# Patient Record
Sex: FEMALE | Race: WHITE | NOT HISPANIC OR LATINO | Employment: OTHER | URBAN - METROPOLITAN AREA
[De-identification: names, ages, dates, MRNs, and addresses within clinical notes are randomized per-mention and may not be internally consistent; named-entity substitution may affect disease eponyms.]

---

## 2017-04-24 ENCOUNTER — OFFICE VISIT (OUTPATIENT)
Dept: URGENT CARE | Facility: RETAIL CLINIC | Age: 72
End: 2017-04-24
Payer: COMMERCIAL

## 2017-04-24 VITALS
HEART RATE: 74 BPM | TEMPERATURE: 98.2 F | SYSTOLIC BLOOD PRESSURE: 124 MMHG | DIASTOLIC BLOOD PRESSURE: 77 MMHG | OXYGEN SATURATION: 97 %

## 2017-04-24 DIAGNOSIS — R05.9 COUGH: ICD-10-CM

## 2017-04-24 DIAGNOSIS — J20.9 ACUTE BRONCHITIS WITH COEXISTING CONDITION REQUIRING PROPHYLACTIC TREATMENT: Primary | ICD-10-CM

## 2017-04-24 PROCEDURE — 99213 OFFICE O/P EST LOW 20 MIN: CPT | Performed by: PHYSICIAN ASSISTANT

## 2017-04-24 RX ORDER — BENZONATATE 200 MG/1
200 CAPSULE ORAL 3 TIMES DAILY PRN
Qty: 21 CAPSULE | Refills: 0 | Status: SHIPPED | OUTPATIENT
Start: 2017-04-24 | End: 2017-12-01

## 2017-04-24 RX ORDER — AZITHROMYCIN 250 MG/1
TABLET, FILM COATED ORAL
Qty: 6 TABLET | Refills: 0 | Status: SHIPPED | OUTPATIENT
Start: 2017-04-24 | End: 2017-12-01

## 2017-04-24 NOTE — NURSING NOTE
"Chief Complaint   Patient presents with     Cough     x 6 days     Headache     Chills       Initial /77  Pulse 74  Temp 98.2  F (36.8  C) (Oral)  SpO2 97% Estimated body mass index is 23.11 kg/(m^2) as calculated from the following:    Height as of 2/6/13: 5' 5.5\" (1.664 m).    Weight as of 11/23/16: 141 lb (64 kg).  Medication Reconciliation: complete   Yolanda Hidalgo      "

## 2017-04-24 NOTE — PATIENT INSTRUCTIONS
Please FOLLOW UP at primary care clinic if not improving, new symptoms, worse or this does not resolve.  [facility]  [phone]

## 2017-04-24 NOTE — PROGRESS NOTES
Chief Complaint   Patient presents with     Cough     x 6 days     Headache     Chills         SUBJECTIVE:   Pt. presenting to [facility] -  with a chief complaint of sudden onset of chills and cough 6 days ago. Cough persists. No fever. Looser stools, fluids ok.Tired.. No SOB or chest pain at rest.  Hx of asthma  Or COPD - none.  Onset of symptoms sudden  Course of illness is same.    Severity moderate  Current and Associated symptoms: fever, cough , headache, malaise  Treatment measures tried include Fluids, OTC meds and Rest.  Predisposing factors include day care kids 'colds'.  Last antibiotic 11/2016 Bactrim for UTI      Smoker no    No past medical history on file.  Past Surgical History:   Procedure Laterality Date     C APPENDECTOMY       C LIGATE FALLOPIAN TUBE,POSTPARTUM      Tubal Ligation     C VAGINAL HYSTERECTOMY  1981    Hysterectomy, Vaginal     Patient Active Problem List   Diagnosis     Viral warts     CARDIOVASCULAR SCREENING; LDL GOAL LESS THAN 160     No current outpatient prescriptions on file.     No current facility-administered medications for this visit.      OBJECTIVE:  /77  Pulse 74  Temp 98.2  F (36.8  C) (Oral)  SpO2 97%    GENERAL APPEARANCE: cooperative, alert and no distress. Appears well hydrated.  EYES: conjunctiva clear  HENT: Rt ear canal  clear and TM normal   Lt ear canal clear and TM normal   Nose minimal congestion. no discharge  Mouth without ulcers or lesions. no erythema. no exudate.   NECK: supple, few small shoddy NT ant nodes. No  posterior nodes.  RESP: lungs  no rales or wheezes but some sonorous ronchi daphne. Breathing easily. Deep loose cough  CV: regular rates and rhythm  ABDOMEN:  soft, nontender, no HSM or masses and bowel sounds normal   SKIN: no suspicious lesions or rashes  no tenderness to palpate over  sinus areas.    ASSESSMENT:     Cough  Acute bronchitis with coexisting condition requiring prophylactic  treatment      PLAN:  Symptomatic measures   Prescriptions as below. Discussed indications, dosing, side affects and adverse reactions of medications with  Patient -Z pack (no history of arrhythmias) and Tessalon Perles  Eat yogurt daily or take a probiotic supplement when on antibiotics.  OTC cough suppressant/expectorant discussed -see Rx    Cool mist vaporizer.   Stay in clean air environment.  > rest.  > fluids.  Contagiousness and hygiene discussed.  Fever and pain  control measures discussed.   If unable to swallow or any breathing difficulty to go to ED AVS given and discussed:  Patient Instructions     Please FOLLOW UP at primary care clinic if not improving, new symptoms, worse or this does not resolve.  [facility]  [phone]    Pt is comfortable with this plan.  Electronically signed,  GOYO Ayoub, PAC

## 2017-04-24 NOTE — MR AVS SNAPSHOT
"              After Visit Summary   2017    Charleen Childress    MRN: [id number]           Patient Information     Date Of Birth          1945        Visit Information        Provider Department      2017 10:10 AM Rosa M Ayoub PA-C [facility]        Today's Diagnoses     Cough    -  1    Acute bronchitis with coexisting condition requiring prophylactic treatment          Care Instructions      Please FOLLOW UP at primary care clinic if not improving, new symptoms, worse or this does not resolve.  [facility]  [phone]          Follow-ups after your visit        Who to contact     You can reach your care team any time of the day by calling [phone].  Notification of test results:  If you have an abnormal lab result, we will notify you by phone as soon as possible.         Additional Information About Your Visit        MyChart Information     [employer] lets you send messages to your doctor, view your test results, renew your prescriptions, schedule appointments and more. To sign up, go to [email or web address] . Click on \"Log in\" on the left side of the screen, which will take you to the Welcome page. Then click on \"Sign up Now\" on the right side of the page.     You will be asked to enter the access code listed below, as well as some personal information. Please follow the directions to create your username and password.     Your access code is: [email or web address]  Expires: 2017 10:41 AM     Your access code will  in 90 days. If you need help or a new code, please call your [facility] or [phone].        Care EveryWhere ID     This is your Care EveryWhere ID. This could be used by other organizations to access your [place] medical records  [id number]        Your Vitals Were     Pulse Temperature Pulse Oximetry             74 98.2  F (36.8  C) (Oral) 97%          Blood Pressure from Last 3 Encounters:   17 124/77   16 150/74 "   07/24/14 127/77    Weight from Last 3 Encounters:   11/23/16 141 lb (64 kg)   02/06/13 149 lb (67.6 kg)   06/12/12 145 lb (65.8 kg)              Today, you had the following     No orders found for display         Today's Medication Changes          These changes are accurate as of: 4/24/17 10:41 AM.  If you have any questions, ask your nurse or doctor.               Start taking these medicines.        Dose/Directions    azithromycin 250 MG tablet   Commonly known as:  ZITHROMAX   Used for:  Acute bronchitis with coexisting condition requiring prophylactic treatment   Started by:  Rosa M Ayoub PA-C        Two tablets first day, then one tablet daily for four days.   Quantity:  6 tablet   Refills:  0       benzonatate 200 MG capsule   Commonly known as:  TESSALON   Used for:  Cough   Started by:  Rosa M Ayoub PA-C        Dose:  200 mg   Take 1 capsule (200 mg) by mouth 3 times daily as needed for cough   Quantity:  21 capsule   Refills:  0            Where to get your medicines      These medications were sent to [address]  [address] [facility] [address]     Phone:  [phone]     azithromycin 250 MG tablet    benzonatate 200 MG capsule                Primary Care Provider Office Phone # Fax #    Tai Randhawa [phone]       XXX RESIGNED  [facility]   [facility] [place] [address]        Thank you!     Thank you for choosing [facility]  for your care. Our goal is always to provide you with excellent care. Hearing back from our patients is one way we can continue to improve our services. Please take a few minutes to complete the written survey that you may receive in the mail after your visit with us. Thank you!             Your Updated Medication List - Protect others around you: Learn how to safely use, store and throw away your medicines at www.disposemymeds.org.          This list is accurate as of: 4/24/17  10:41 AM.  Always use your most recent med list.                   Brand Name Dispense Instructions for use    azithromycin 250 MG tablet    ZITHROMAX    6 tablet    Two tablets first day, then one tablet daily for four days.       benzonatate 200 MG capsule    TESSALON    21 capsule    Take 1 capsule (200 mg) by mouth 3 times daily as needed for cough

## 2017-12-01 ENCOUNTER — OFFICE VISIT (OUTPATIENT)
Dept: URGENT CARE | Facility: RETAIL CLINIC | Age: 72
End: 2017-12-01
Payer: COMMERCIAL

## 2017-12-01 VITALS
DIASTOLIC BLOOD PRESSURE: 71 MMHG | TEMPERATURE: 97.9 F | OXYGEN SATURATION: 98 % | HEART RATE: 60 BPM | SYSTOLIC BLOOD PRESSURE: 140 MMHG

## 2017-12-01 DIAGNOSIS — H61.21 IMPACTED CERUMEN OF RIGHT EAR: Primary | ICD-10-CM

## 2017-12-01 DIAGNOSIS — H92.01 DISCOMFORT OF RIGHT EAR: ICD-10-CM

## 2017-12-01 PROCEDURE — 69209 REMOVE IMPACTED EAR WAX UNI: CPT | Mod: RT | Performed by: PHYSICIAN ASSISTANT

## 2017-12-01 NOTE — PATIENT INSTRUCTIONS
Please FOLLOW UP at primary care clinic if not improving, new symptoms, worse or this does not resolve.  [facility]  [phone]

## 2017-12-01 NOTE — PROGRESS NOTES
Chief Complaint   Patient presents with     Ear Problem     right ear plugged for 4 days      S: Pt presents to [facility] in [place] with possible cerumen impaction.  Plugged sensation rt ear x 4 days.  No URI symptoms  No fever .  No ear pain .  Hx of cerumen impaction - yes - had some waxed removed at an 'ear clinic' at a retail store about 1 month ago. Seemed to be ok. Used mineral oil and gfentle ear flush at home and now more plugged    ROS:  ENT - denies ear pain, throat pain. No nasal congestion.  CP - no cough,SOB or chest pain.   GI/ - Appetite - normal. No nausea, vomiting or diarrhea.   No bowel or bladder changes   MSK - no joint pain or swelling.   Skin-  No rashes. no lesions.    No past medical history on file.  Past Surgical History:   Procedure Laterality Date     C APPENDECTOMY       C LIGATE FALLOPIAN TUBE,POSTPARTUM      Tubal Ligation     C VAGINAL HYSTERECTOMY  1981    Hysterectomy, Vaginal     Patient Active Problem List   Diagnosis     Viral warts     CARDIOVASCULAR SCREENING; LDL GOAL LESS THAN 160     Current Outpatient Prescriptions   Medication     DiphenhydrAMINE HCl (BENADRYL PO)     No current facility-administered medications for this visit.          O: /71  Pulse 60  Temp 97.9  F (36.6  C) (Oral)  SpO2 98%    Rt canal filled with cerumen. Successful lavage by NB, MA and TM appears normal.  Lt canal clear and TM appears normal.  N,T,Neck - unremarkable  Lungs - clear    A;    Discomfort of right ear  Impacted cerumen of right ear      P: No Qtips  Ear hygiene discussed.  Followup with PCP if not improving and anytime if worse.    PT is comfortable with this plan.  Electronically signed,  KIM Cassidy

## 2017-12-01 NOTE — NURSING NOTE
"Chief Complaint   Patient presents with     Ear Problem     right ear plugged for 4 days        Initial /71  Pulse 60  Temp 97.9  F (36.6  C) (Oral)  SpO2 98% Estimated body mass index is 23.11 kg/(m^2) as calculated from the following:    Height as of 2/6/13: 5' 5.5\" (1.664 m).    Weight as of 11/23/16: 141 lb (64 kg).  Medication Reconciliation: complete   Yolanda Hidalgo      "

## 2019-01-14 ENCOUNTER — APPOINTMENT (OUTPATIENT)
Dept: GENERAL RADIOLOGY | Facility: CLINIC | Age: 74
End: 2019-01-14
Payer: MEDICARE

## 2019-01-14 ENCOUNTER — HOSPITAL ENCOUNTER (EMERGENCY)
Facility: CLINIC | Age: 74
Discharge: HOME | End: 2019-01-14
Attending: FAMILY MEDICINE | Admitting: FAMILY MEDICINE
Payer: MEDICARE

## 2019-01-14 VITALS
OXYGEN SATURATION: 96 % | WEIGHT: 149 LBS | HEART RATE: 60 BPM | RESPIRATION RATE: 16 BRPM | BODY MASS INDEX: 24.42 KG/M2 | DIASTOLIC BLOOD PRESSURE: 96 MMHG | TEMPERATURE: 98.6 F | SYSTOLIC BLOOD PRESSURE: 182 MMHG

## 2019-01-14 DIAGNOSIS — S20.212A CHEST WALL CONTUSION, LEFT, INITIAL ENCOUNTER: ICD-10-CM
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
## 2019-01-14 PROCEDURE — 99284 EMERGENCY DEPT VISIT MOD MDM: CPT | Mod: 25 | Performed by: FAMILY MEDICINE

## 2019-01-14 PROCEDURE — 99284 EMERGENCY DEPT VISIT MOD MDM: CPT | Mod: Z6 | Performed by: FAMILY MEDICINE

## 2019-01-14 PROCEDURE — 71046 X-RAY EXAM CHEST 2 VIEWS: CPT | Mod: TC

## 2019-01-14 PROCEDURE — 73010 X-RAY EXAM OF SHOULDER BLADE: CPT | Mod: TC,LT

## 2019-01-14 NOTE — ED AVS SNAPSHOT
[facility] Emergency Department  [address] [place] [address]  Phone:  [phone]  Fax:  [phone]                                    Charleen Childress   MRN: [id number]    Department:  [facility] Emergency Department   Date of Visit:  1/14/2019           After Visit Summary Signature Page    I have received my discharge instructions, and my questions have been answered. I have discussed any challenges I see with this plan with the nurse or doctor.    ..........................................................................................................................................  Patient/Patient Representative Signature      ..........................................................................................................................................  Patient Representative Print Name and Relationship to Patient    ..................................................               ................................................  Date                                   Time    ..........................................................................................................................................  Reviewed by Signature/Title    ...................................................              ..............................................  Date                                               Time          22EPIC Rev 08/18

## 2019-01-14 NOTE — ED TRIAGE NOTES
Pt presents with left posterior shoulder pain, scapular pain and left posterior rib pain, complaining of pain with inspiration.  She reports that she fell on the ice approx 2 hour hours ago.  She has full ROM in her neck and shoulder, denies hitting her head.

## 2019-01-15 NOTE — ED NOTES
"Sitting on chair hunched over. States it is the most comfortable position. \"it doesn't hurt to breath this way\". Awaiting xray results  "

## 2019-01-15 NOTE — ED PROVIDER NOTES
ED Provider Note     Charleen Childress  [id number]  January 14, 2019      CC:     Chief Complaint   Patient presents with     Fall          History is obtained from the patient.    HPI: Charleen Childress is a 73 year old female presenting with pain to the left lower rib cage as well as to the left scapula after falling on the ice.  Patient states that she was taking down her [place] lights.  She was in between her front door in the garage to get a ladder when she slipped on the ice and landed on her left side.  She did not have the wind knocked out of her, but started to feel pain over the left scapula as well as the left rib cage.  She is able to move the arm, but has difficulty with deep breathing.  Pain is worsened by deep breathing.  She has placed ice over the left scapula as well as the left lower rib.  She has not noticed any obvious deformity, bruising or crackling of the chest.  She has had previous rib fractures before.  Her current pain level is 5/10 and at rest with shallow breathing 1/10.  She denies any head, neck, or spine injury.  There is no pain to the pelvis, hips, knees or upper extremities.  She has not taken anything for the pain.      PMH/Problem List:   History reviewed. No pertinent past medical history.    PSH:   Past Surgical History:   Procedure Laterality Date     C APPENDECTOMY       C LIGATE FALLOPIAN TUBE,POSTPARTUM      Tubal Ligation     C VAGINAL HYSTERECTOMY  1981    Hysterectomy, Vaginal       MEDS:     No current facility-administered medications on file prior to encounter.   Current Outpatient Medications on File Prior to Encounter:  DiphenhydrAMINE HCl (BENADRYL PO)        Allergies: No known drug allergies    Triage and nursing notes were reviewed.    ROS: All other systems were reviewed and are negative    Physical Exam:  Vitals:    01/14/19 1724   BP: (!) 182/96   Pulse: 60   Resp: 16   Temp: 98.6  F (37  C)    TempSrc: Temporal   SpO2: 96%   Weight: 67.6 kg (149 lb)     GENERAL APPEARANCE: Alert and oriented x3.  GCS 15  HEAD: atraumatic  EYES: PERRL, EOMI  HENT: Normal external exam  NECK: no adenopathy or masses, trachea is midline; no midline spinous tenderness  RESP: lungs clear to auscultation - no rales, rhonchi or wheezes  CV: regular rate and rhythm, no significant murmurs or rubs  CHEST: No crepitus, no obvious deformity or step-off.  There is some mild tenderness on palpation of the left anterior lower ribs; tenderness along the left scapular region  ABDOMEN: soft, nontender, no masses with normal bowel sounds  EXT: Pelvis is stable, no pain with upper or lower extremities  SKIN: no rash; as above  NEURO: mentation and speech normal; no focal deficits    Labs/Imaging Results:  Results for orders placed or performed during the hospital encounter of 01/14/19 (from the past 24 hour(s))   XR Chest 2 Views    Narrative    XR CHEST TWO VIEWS   1/14/2019 6:39 PM     HISTORY: Left chest wall pain.    COMPARISON: Chest x-ray 3/11/2008.      Impression    IMPRESSION: PA and lateral views of the chest. Lungs are clear. Heart  is normal in size. No effusions are evident. No pneumothorax. Mild  thoracic kyphosis is slightly more accentuated than on the prior exam.  Questionable posterior left second rib fracture near the lung apex on  the frontal view. No other obvious fracture is evident.    ANNEMARIE MERCER MD   XR Scapula Left    Narrative    LEFT SCAPULA    1/14/2019 6:40 PM     HISTORY: Fall. Pain.     COMPARISON: None.      Impression    IMPRESSION: Two views of the left scapula are performed. No obvious  fracture. Left humerus appears located in the glenoid fossa.  Acromioclavicular joint appears intact.    ANNEMARIE MERCER MD           Impression:  Final diagnoses:   Chest wall contusion, left   Fall due to slipping on ice or snow         ED Course & Medical Decision Making (Plan):  Charleen Childress is a 73 year old female  who fell on the ice approximately 3 hours ago.  Patient landed on her back, and has pain to the left scapula as well as the left anterior lower rib cage.  Patient has pain with taking a deep breath.  Pain level is rated 5/10.  Patient was seen shortly after arrival.  Vital signs were normal with temp of 88.6, blood pressure of 182/96, heart rate of 60 with 96% oxygen saturation.  Exam revealed symmetrical breath sounds, with mild tenderness over the left anterior rib cage and over the left posterior scapula.  X-rays of the chest and left scapula were performed and revealed no definite fracture.  There is slight abnormality of the left second rib near the lung apex on the frontal view.  This was personally reviewed by me and with the patient.  She has no pain in the upper part of the clavicle or posterior shoulder.  Her pain is over the mid to lower portion of the scapula as well as the left lower rib cage.  Patient will ice all sore areas, take Tylenol and ibuprofen as needed for pain.  Recheck in the clinic in 3 days if not improving.  Return to the ED at any time if symptoms worsen.        Written after-visit summary and instructions were given at the time of discharge.          Follow Up Plan:  Tai Randhawa MD  XXX RESIGNED XXX  [address] [place] [id number] [phone]    In 3 days  if not improving    [facility] Emergency Department  [address] [place] [id number] [phone]    If symptoms worsen        Discharge Meds: Tylenol and naproxen        This note was completed in part using Dragon voice recognition, and may contain word and grammatical errors.        Colby Mcnair MD  01/14/19 1923

## 2019-01-15 NOTE — DISCHARGE INSTRUCTIONS
Your x-rays were reassuring.  There is a possibility that you have a left second rib fracture.  There is no signs of pneumothorax or scapular fracture.  Ice all sore areas frequently.  Take your naproxen and Tylenol as needed for pain for the next several days.  Follow-up in the clinic in 2-3 days if not improving.  Return to the emergency department at any time if your pain or breathing worsens.

## 2019-01-23 ENCOUNTER — HOSPITAL ENCOUNTER (EMERGENCY)
Facility: CLINIC | Age: 74
Discharge: HOME | End: 2019-01-23
Attending: FAMILY MEDICINE | Admitting: FAMILY MEDICINE
Payer: MEDICARE

## 2019-01-23 ENCOUNTER — APPOINTMENT (OUTPATIENT)
Dept: GENERAL RADIOLOGY | Facility: CLINIC | Age: 74
End: 2019-01-23
Payer: MEDICARE

## 2019-01-23 VITALS
HEIGHT: 65 IN | WEIGHT: 149 LBS | BODY MASS INDEX: 24.83 KG/M2 | SYSTOLIC BLOOD PRESSURE: 155 MMHG | RESPIRATION RATE: 16 BRPM | TEMPERATURE: 98.6 F | HEART RATE: 59 BPM | DIASTOLIC BLOOD PRESSURE: 81 MMHG | OXYGEN SATURATION: 99 %

## 2019-01-23 DIAGNOSIS — S22.42XA CLOSED FRACTURE OF MULTIPLE RIBS OF LEFT SIDE, INITIAL ENCOUNTER: ICD-10-CM

## 2019-01-23 PROCEDURE — 99284 EMERGENCY DEPT VISIT MOD MDM: CPT | Mod: Z6 | Performed by: FAMILY MEDICINE

## 2019-01-23 PROCEDURE — 71101 X-RAY EXAM UNILAT RIBS/CHEST: CPT | Mod: TC,LT

## 2019-01-23 PROCEDURE — 99283 EMERGENCY DEPT VISIT LOW MDM: CPT | Performed by: FAMILY MEDICINE

## 2019-01-23 RX ORDER — HYDROCODONE BITARTRATE AND ACETAMINOPHEN 5; 325 MG/1; MG/1
1 TABLET ORAL EVERY 6 HOURS PRN
Qty: 18 TABLET | Refills: 0 | Status: SHIPPED | OUTPATIENT
Start: 2019-01-23 | End: 2019-01-29

## 2019-01-23 ASSESSMENT — MIFFLIN-ST. JEOR: SCORE: 1176.74

## 2019-01-23 NOTE — ED PROVIDER NOTES
History     Chief Complaint   Patient presents with     Shoudler blade pain     HPI  Charleen Childress is a 73 year old female who presents with left sided shoulder blade pain.  Patient fell 2 days ago and was actually seen in the emergency department.  Had x-rays of her scapula and chest done and they saw a questionable rib fracture.  Patient was sent home with just ibuprofen and Tylenol.  She comes back in now because she is still having significant pain and is not been able to manage things at home.  She states that she is able to move her arm without significant problems but if she takes a really deep breath or coughs it makes the pain worse.  Patient denies any new fevers or chills.      Allergies:  Allergies   Allergen Reactions     No Known Drug Allergies        Problem List:    Patient Active Problem List    Diagnosis Date Noted     CARDIOVASCULAR SCREENING; LDL GOAL LESS THAN 160 10/31/2010     Priority: Medium     Viral warts 11/24/2006     Priority: Medium     Problem list name updated by automated process. Provider to review          Past Medical History:    History reviewed. No pertinent past medical history.    Past Surgical History:    Past Surgical History:   Procedure Laterality Date     C APPENDECTOMY       C LIGATE FALLOPIAN TUBE,POSTPARTUM      Tubal Ligation     C VAGINAL HYSTERECTOMY  1981    Hysterectomy, Vaginal       Family History:    Family History   Problem Relation Age of Onset     Cerebrovascular Disease Mother      Diabetes Mother      Cancer Father        Social History:  Marital Status:   [2]  Social History     Tobacco Use     Smoking status: Never Smoker     Smokeless tobacco: Never Used   Substance Use Topics     Alcohol use: Yes     Comment: seldom     Drug use: No        Medications:      DiphenhydrAMINE HCl (BENADRYL PO)         Review of Systems   All other systems reviewed and are negative.      Physical Exam   BP: 155/81  Pulse: 59  Temp: 98.6  F (37  C)  Resp:  "16  Height: 165.1 cm (5' 5\")  Weight: 67.6 kg (149 lb)  SpO2: 99 %      Physical Exam   Constitutional: She appears well-developed and well-nourished. No distress.   HENT:   Head: Normocephalic and atraumatic.   Mouth/Throat: No oropharyngeal exudate.   Eyes: Conjunctivae are normal.   Neck: Normal range of motion.   Cardiovascular: Normal rate and normal heart sounds.   No murmur heard.  Musculoskeletal:        Thoracic back: She exhibits tenderness, bony tenderness and pain. She exhibits normal range of motion, no swelling, no edema, no deformity and no laceration.        Back:    Skin: She is not diaphoretic.   Nursing note and vitals reviewed.      ED Course        Procedures           Results for orders placed or performed during the hospital encounter of 01/23/19   Ribs XR, unilat 3 views + PA chest,  left    Narrative    RIBS AND CHEST LEFT THREE VIEWS January 23, 2019 10:33 AM     HISTORY: Fall, questionable left posterior rib fracture.    COMPARISON: Chest x-rays dated 1/14/2019.    FINDINGS: Subtle increased interstitial markings in the bilateral  apices are again noted and likely represents scarring. This could also  represent an infiltrative process or tiny nodules. Lungs are otherwise  grossly clear. Heart size and pulmonary vascularity are within normal  limits. There are posterolateral left third, fourth, fifth, sixth and  likely seventh rib fractures which are minimally displaced. No  pneumothorax or significant hemothorax is identified. No other acute  fractures are identified. These are actually best seen on the PA chest  x-ray and were also seen on the oblique views. The left posterolateral  second rib is fractured with displacement and overriding of the distal  fragment. This is best seen on the oblique view.      Impression    IMPRESSION:  1. Displaced posterolateral left second through seventh rib fractures  as described above. No pneumothorax or significant hemothorax is  identified.  2. " Slightly increased interstitial markings in the lung apices likely  represent scarring and similar to the prior study from 1/14/2019 and  are minimally more prominent than on prior study from 2008.     X-ray shows multiple rib fractures on the left side from the second through the seventh ribs.  There is no signs of lung injury, or pneumothorax.  A fall actually happened a week and 2 days ago.  She has been dealing with this at home.  I did talk to general surgery because the patient has dealt with this for so long and is not had any complications yet there is no indication for observation here in the hospital.  We did discuss other options for pain control including adding some hydrocodone that she will mainly use at night to help her sleep.  I recommend that she still have a recheck in the next week with her primary care doctor.  I have set up an appointment for her.  At this point I think it is safe to discharge her home, all questions were answered.    Assessments & Plan (with Medical Decision Making)  Multiple rib fractures     I have reviewed the nursing notes.    I have reviewed the findings, diagnosis, plan and need for follow up with the patient.              1/23/2019   [facility] EMERGENCY DEPARTMENT     Edmund Kidd MD  01/23/19 3503

## 2019-01-23 NOTE — ED TRIAGE NOTES
States slipped on ice 1/14/2019. Seen in the ED for left shoulder pain. Pain has worsened. Is in the left shoulder blade area. Using motrin with little relief and icing. Only helps a short time. Now left hand is feeling a little numb. Cap refill less than 2 secs, able to wiggle left fingers.

## 2019-01-23 NOTE — ED AVS SNAPSHOT
[facility] Emergency Department  [address] [place] [address]  Phone:  [phone]  Fax:  [phone]                                    Charleen Childress   MRN: [id number]    Department:  [facility] Emergency Department   Date of Visit:  1/23/2019           After Visit Summary Signature Page    I have received my discharge instructions, and my questions have been answered. I have discussed any challenges I see with this plan with the nurse or doctor.    ..........................................................................................................................................  Patient/Patient Representative Signature      ..........................................................................................................................................  Patient Representative Print Name and Relationship to Patient    ..................................................               ................................................  Date                                   Time    ..........................................................................................................................................  Reviewed by Signature/Title    ...................................................              ..............................................  Date                                               Time          22EPIC Rev 08/18

## 2019-01-29 ENCOUNTER — OFFICE VISIT (OUTPATIENT)
Dept: FAMILY MEDICINE | Facility: OTHER | Age: 74
End: 2019-01-29
Payer: COMMERCIAL

## 2019-01-29 VITALS
OXYGEN SATURATION: 100 % | DIASTOLIC BLOOD PRESSURE: 82 MMHG | BODY MASS INDEX: 24.55 KG/M2 | WEIGHT: 147.5 LBS | RESPIRATION RATE: 16 BRPM | TEMPERATURE: 96.2 F | HEART RATE: 72 BPM | SYSTOLIC BLOOD PRESSURE: 140 MMHG

## 2019-01-29 DIAGNOSIS — S22.42XD CLOSED FRACTURE OF MULTIPLE RIBS OF LEFT SIDE WITH ROUTINE HEALING, SUBSEQUENT ENCOUNTER: Primary | ICD-10-CM

## 2019-01-29 PROCEDURE — 99213 OFFICE O/P EST LOW 20 MIN: CPT | Performed by: PHYSICIAN ASSISTANT

## 2019-01-29 RX ORDER — HYDROCODONE BITARTRATE AND ACETAMINOPHEN 5; 325 MG/1; MG/1
1 TABLET ORAL
COMMUNITY
End: 2019-05-22

## 2019-01-29 ASSESSMENT — PAIN SCALES - GENERAL: PAINLEVEL: EXTREME PAIN (8)

## 2019-01-29 NOTE — NURSING NOTE
Health Maintenance Due   Topic Date Due     PHQ-2 Q1 YR  10/17/1957     DTAP/TDAP/TD IMMUNIZATION (1 - Tdap) 10/17/1969     LIPID SCREEN Q5 YR FEMALE (SYSTEM ASSIGNED)  10/17/1989     COLON CANCER SCREEN (SYSTEM ASSIGNED)  10/17/1994     ZOSTER IMMUNIZATION (1 of 2) 10/17/1994     ADVANCE DIRECTIVE PLANNING Q5 YRS  10/17/1999     FALL RISK ASSESSMENT  10/17/2009     DEXA SCAN SCREENING (SYSTEM ASSIGNED)  10/17/2009     MAMMO SCREEN Q2 YR (SYSTEM ASSIGNED)  05/27/2012     INFLUENZA VACCINE (1) 09/01/2018     Tatianna WASHBURN LPN

## 2019-01-29 NOTE — PATIENT INSTRUCTIONS
Patient Education         Using an Incentive Spirometer    An incentive spirometer is a device that helps you do deep breathing exercises. These exercises expand your lungs, aid in circulation, and help prevent pneumonia. Deep breathing exercises also help you breathe better and improve the function of your lungs by:    Keeping your lungs clear    Strengthening your breathing muscles    Helping prevent respiratory complications or problems  The incentive spirometer gives you a way to take an active part in your recovery. A nurse or therapist will teach you breathing exercises. To do these exercises, you will breathe in through your mouth and not your nose. The incentive spirometer only works correctly if you breathe in through your mouth.  Steps to clear lungs  Step 1. Exhale normally. Then, inhale normally.    Relax and breathe out.  Step 2. Place your lips tightly around the mouthpiece.    Make sure the device is upright and not tilted.  Step 3. Inhale as much air as you can through the mouthpiece (don't breathe through your nose).    Inhale slowly and deeply.    Hold your breath long enough to keep the balls or disk raised for at least 3 to 5 seconds, or as instructed by your healthcare provider.    Some spirometers have an indicator to let you know that you are breathing in too fast. If the indicator goes off, breathe in more slowly.  Step 4. Repeat the exercise regularly.    Do this exercise every hour while you're awake, or as instructed by your healthcare provider.    If you were taught deep breathing and coughing exercises, do them regularly as instructed by your healthcare provider.   Follow-up care  Make a follow up appointment, or as directed. Also, follow up with your healthcare provider as advised or if your symptoms don't improve or continue to get worse.  When to call your healthcare provider  Call your healthcare provider right away if you have any of the following:    Fever 100.4  (38 C) or  higher, or as directed by your healthcare provider    Brownish, bloody, or smelly sputum (phlegm that you cough up)  Call 911  Call 911 if any of these occur:     Shortness of breath that doesn't get better after taking your medicine    Cool, moist, pale or blue skin    Trouble breathing or swallowing, wheezing    Fainting or loss of consciousness    Feeling of dizziness or weakness, or a sudden drop in blood pressure    Feeling very ill    Lightheadedness    Chest pain or rapid heart rate  Date Last Reviewed: 1/1/2017 2000-2018 [employer]. [address] [place] [address]. All rights reserved. This information is not intended as a substitute for professional medical care. Always follow your healthcare professional's instructions.           Patient Education     Rib Fracture    You broke one or more ribs. This is called a rib fracture. Rib fractures don't need a cast like other bones. They will heal by themselves in about 4 to 6 weeks. The first 3 to 4 weeks will be the most painful. During this time deep breathing, coughing, or changing position from sitting to lying down, may cause the broken ends to move slightly.  Home care    Rest. You should not be doing any heavy lifting or strenuous exertion until the pain goes away.    It hurts to breathe when you have a broken rib. This puts you at risk of getting pneumonia from poor airflow through your lungs. To prevent this:  ? Take several very deep breaths once an hour while you're awake. Breathe out through pursed lips as if you are blowing up a balloon. If possible, actually blow up a balloon or a rubber glove. This exercise builds up pressure inside the lung and prevents collapse of the small air sacs of the lung. This exercise may cause some pain at the site of injury. This is normal.  ? You may have gotten a breathing exercise device called an incentive spirometer. Use it at least 4 times a day, or as directed.    Apply an ice pack over  the injured area for 15 to 20 minutes every 1 to 2 hours. You should do this for the first 24 to 48 hours. To make an ice pack, put ice cubes in a plastic bag that seals at the top. Wrap the bag in a clean, thin towel or cloth. Never put ice or an ice pack directly on the skin. Keep using ice packs as needed for the relief of pain and swelling.    You may use over-the-counter pain medicine to control pain, unless another pain medicine was prescribed. If you have chronic liver or kidney disease or ever had a stomach ulcer or GI (gastrointestinal) bleeding, talk with your healthcare provider before using these medicines.    If your pain is not controlled, contact your healthcare provider. Sometimes a stronger pain medicine may be needed. A nerve block can be done in case of severe pain. It will numb the nerve between the ribs.  Follow-up care  Follow up with your healthcare provider, or as advised. In rare cases, a broken rib will cause complications in the first few days that may not be clearly seen during your initial exam. This can include collapsed lung, bleeding around the lung or into the belly (abdomen), or pneumonia. So watch for the signs below.  If X-rays were taken, you will be told of any new findings that may affect your care.  Call 911  Call 911 if you have:    Dizziness, weakness or fainting    Shortness of breath with or without chest discomfort    New or worsening abdominal pain    Discomfort in other areas of your upper body such as your shoulders, jaw, neck, or arms  When to seek medical advice  Call your healthcare provider right away if any of these occur:    Increasing chest pain with breathing    Fever of 100.4 F (38 C) or above, or as directed by your healthcare provider    Congested cough, nausea, or vomiting  Date Last Reviewed: 4/1/2018 2000-2018 [employer]. [address] [place] [address], [place] [address]. All rights reserved. This information is not intended as a substitute for  professional medical care. Always follow your healthcare professional's instructions.

## 2019-01-29 NOTE — PROGRESS NOTES
SUBJECTIVE:   Charleen Childress is a 73 year old female who presents to clinic today for the following health issues:      ED/UC Followup:    Facility:  [facility] emergency room  Date of visit: 1- and 1-  Reason for visit: fall  Current Status: slight improvement          Patient is a 73 year old female who presents today for follow up of recent ED visits. She was seen at [facility] on 01/14/2019 for pain to left ribs cage and scapula following fall on ice. At that time imaging did not show obvious fracture. Patient was discharge with instruction to return if pain does not improve as expected. She returned on 01/23/2019 with worsening pain in the left ribs and scapula. Imaging at this visit identified displaced fractures in the 2nd through 7th rib fractures. Review of ED note shows that general surgery was consulted, felt patient was stable and did not require monitoring. Patient given pain medication and told to limit vigorous activity. Today she says that the pain has improved slightly, but is more noticeable behind the left shoulder blade and posterior axillary line along the area the fractures were identified. Denies trouble breathing, cough, shortness of breath, chest pain, dizziness, hemoptysis, abdominal pain or new bruising.      Problem list and histories reviewed & adjusted, as indicated.  Additional history: as documented    Reviewed and updated as needed this visit by clinical staff  Tobacco  Allergies  Meds  Med Hx  Surg Hx  Fam Hx  Soc Hx      Reviewed and updated as needed this visit by Provider         ROS:  Constitutional, HEENT, cardiovascular, pulmonary, gi and gu systems are negative, except as otherwise noted.    OBJECTIVE:     /82 (BP Location: Right arm, Patient Position: Chair, Cuff Size: Adult Large)   Pulse 72   Temp 96.2  F (35.7  C) (Oral)   Resp 16   Wt 66.9 kg (147 lb 8 oz)   SpO2 100%   BMI 24.55 kg/m    Body mass index is 24.55 kg/m .  GENERAL:  healthy, alert and no distress  RESP: lungs clear to auscultation - no rales, rhonchi or wheezes  CV: regular rate and rhythm, normal S1 S2, no S3 or S4, no murmur, click or rub, no peripheral edema and peripheral pulses strong  ABDOMEN: soft, nontender, no hepatosplenomegaly, no masses and bowel sounds normal  MS: no gross musculoskeletal defects noted, no edema, tenderness along left lateral ribs and left scapula to palpation, no bony step-off  SKIN: no suspicious lesions or rashes  NEURO: Normal strength and tone, mentation intact and speech normal  PSYCH: mentation appears normal, affect normal/bright    Diagnostic Test Results:  none     ASSESSMENT/PLAN:     1. Closed fracture of multiple ribs of left side with routine healing, subsequent encounter  Discussed frequent use of incentive spirometer with patient to prevent atelectasis. Patient has a spirometer at home which she can use. Patient may use soft pillow against area of discomfort as needed. Patient instructed to refrain from intense activity/heavy labor for next 3-4 weeks. Return immediately to the ED for any concerning symptoms, such as shortness of breath, increasing pain, or signs of pneumonia (eg, cough, fever).         Follow up with clinic 1 week or sooner if conditions change, worsen or fail to improve as expected.      Gavino Sauceda PA-C  [facility]

## 2019-02-07 ENCOUNTER — OFFICE VISIT (OUTPATIENT)
Dept: FAMILY MEDICINE | Facility: OTHER | Age: 74
End: 2019-02-07
Payer: COMMERCIAL

## 2019-02-07 VITALS
WEIGHT: 149.2 LBS | SYSTOLIC BLOOD PRESSURE: 138 MMHG | TEMPERATURE: 95.1 F | BODY MASS INDEX: 24.83 KG/M2 | DIASTOLIC BLOOD PRESSURE: 80 MMHG | HEART RATE: 76 BPM | RESPIRATION RATE: 20 BRPM

## 2019-02-07 DIAGNOSIS — S22.42XD CLOSED FRACTURE OF MULTIPLE RIBS OF LEFT SIDE WITH ROUTINE HEALING, SUBSEQUENT ENCOUNTER: Primary | ICD-10-CM

## 2019-02-07 PROCEDURE — 99213 OFFICE O/P EST LOW 20 MIN: CPT | Performed by: PHYSICIAN ASSISTANT

## 2019-02-07 ASSESSMENT — PAIN SCALES - GENERAL: PAINLEVEL: MODERATE PAIN (5)

## 2019-02-07 NOTE — NURSING NOTE
Health Maintenance Due   Topic Date Due     DTAP/TDAP/TD IMMUNIZATION (1 - Tdap) 10/17/1969     LIPID SCREEN Q5 YR FEMALE (SYSTEM ASSIGNED)  10/17/1989     COLON CANCER SCREEN (SYSTEM ASSIGNED)  10/17/1994     ZOSTER IMMUNIZATION (1 of 2) 10/17/1994     ADVANCE DIRECTIVE PLANNING Q5 YRS  10/17/1999     FALL RISK ASSESSMENT  10/17/2009     DEXA SCAN SCREENING (SYSTEM ASSIGNED)  10/17/2009     PNEUMOCOCCAL IMMUNIZATION 65+ LOW/MEDIUM RISK (1 of 2 - PCV13) 10/17/2009     MAMMO SCREEN Q2 YR (SYSTEM ASSIGNED)  05/27/2012     INFLUENZA VACCINE (1) 09/01/2018     Tatianna WASHBURN LPN

## 2019-02-07 NOTE — PROGRESS NOTES
SUBJECTIVE:   Charleen Childress is a 73 year old female who presents to clinic today for the following health issues:      Concern - recheck ribs  Onset: recheck     Description:   Recheck ribs    Intensity: mild    Progression of Symptoms:  improving    Accompanying Signs & Symptoms:  none    Previous history of similar problem:   no    Precipitating factors:   Worsened by: movement    Alleviating factors:  Improved by: ice    Therapies Tried and outcome: ice, pain medication, good relief    Patient is a 73 year old female who presents for follow up of rib fractures. She was seen on 01/29/2019 following two visits to [facility] ED. Review of imaging from those visits showed posterolateral left rib fractures 2-7. Patient had complained of pain over the affected area and left scapula. She was advised to utilize incentive spirometer at home and limit activity. Today she reports that she has done some light shoveling of snow, used the spirometer as directed and otherwise has been resting. She has felt significant relief with combination of heat and ice. Most recently she was able to start sleeping on the affected side. We discussed slow re-introduction to activity and realistic timeline for full recovery. No imaging will be done today.     Problem list and histories reviewed & adjusted, as indicated.  Additional history: as documented    Reviewed and updated as needed this visit by clinical staff  Tobacco  Allergies  Meds  Med Hx  Surg Hx  Fam Hx  Soc Hx      Reviewed and updated as needed this visit by Provider         ROS:  Constitutional, HEENT, cardiovascular, pulmonary, gi and gu systems are negative, except as otherwise noted.    OBJECTIVE:     /80 (BP Location: Right arm, Patient Position: Chair, Cuff Size: Adult Large)   Pulse 76   Temp 95.1  F (35.1  C) (Oral)   Resp 20   Wt 67.7 kg (149 lb 3.2 oz)   BMI 24.83 kg/m    Body mass index is 24.83 kg/m .  GENERAL: healthy, alert and no  distress  RESP: lungs clear to auscultation - no rales, rhonchi or wheezes  CV: regular rate and rhythm, normal S1 S2, no S3 or S4, no murmur, click or rub, no peripheral edema and peripheral pulses strong  MS: no gross musculoskeletal defects noted, no edema, minimal tenderness to lateral left ribs on palpation, no tenderness to palpation over the left trapezius, rhomboid or scapula.   SKIN: no suspicious lesions or rashes  NEURO: Normal strength and tone, mentation intact and speech normal  PSYCH: mentation appears normal, affect normal/bright    Diagnostic Test Results:  none     ASSESSMENT/PLAN:     1. Closed fracture of multiple ribs of left side with routine healing, subsequent encounter  Patient will continue to use incentive spirometer, limit vigorous activity and take pain medication as needed. She will call clinic if pain worsens, cough develops, or new symptoms appear.       Follow up with clinic as needed or sooner if conditions change, worsen or fail to improve as expected.      Gavino Sauceda PA-C  [facility]

## 2019-05-03 ENCOUNTER — TELEPHONE (OUTPATIENT)
Dept: FAMILY MEDICINE | Facility: OTHER | Age: 74
End: 2019-05-03

## 2019-05-03 NOTE — LETTER
[facility]  [address] [facility] [address]  [phone]        Charleen Childress  [address]  [facility] [address]      May 3, 2019      Dear Charleen,    I care about your health and have reviewed your health plan, including your medical conditions, medication list, and lab results and am making recommendations based on this review, to better manage your health.    You are in particular need of attention regarding:  -Cholesterol  -Breast Cancer Screening  -Colon Cancer Screening  -Wellness (Physical) Visit   -Dexa scan    I am recommending that you:  - If you have not completed a physical inthe last year then please schedule with a provider of your choice.    If you've had the preventative screening completed at another facility or feel you're not due for this screening, please call our clinic at the number listed above or send us a My Chart message so we can update our records. We would like to thank you in advance for taking the time to take care of your health.  If you have any questions, please don t hesitate to contact our clinic.    Sincerely,       Your [facility] Team

## 2019-05-03 NOTE — TELEPHONE ENCOUNTER
Panel Management Review      Patient has the following on her problem list:       Composite cancer screening  Chart review shows that this patient is due/due soon for the following   Summary:    Patient is due/failing the following:   Dexa, Medicare wellness, COLONOSCOPY, LDL and MAMMOGRAM    Action needed:   Patient needs office visit for Medicare wellness visit, colonoscopy, mammogram, dexa scan and cholesterol..    Type of outreach:    Sent letter.    Questions for provider review:    None                                                                                                                                    Tatianna WASHBURN LPN       Chart routed to Tatianna WASHBURN LPN   .

## 2019-05-22 ENCOUNTER — OFFICE VISIT (OUTPATIENT)
Dept: FAMILY MEDICINE | Facility: OTHER | Age: 74
End: 2019-05-22
Payer: COMMERCIAL

## 2019-05-22 ENCOUNTER — ANCILLARY PROCEDURE (OUTPATIENT)
Dept: GENERAL RADIOLOGY | Facility: OTHER | Age: 74
End: 2019-05-22
Attending: FAMILY MEDICINE
Payer: COMMERCIAL

## 2019-05-22 VITALS
RESPIRATION RATE: 18 BRPM | WEIGHT: 150 LBS | HEART RATE: 74 BPM | DIASTOLIC BLOOD PRESSURE: 66 MMHG | OXYGEN SATURATION: 98 % | TEMPERATURE: 98.2 F | BODY MASS INDEX: 24.96 KG/M2 | SYSTOLIC BLOOD PRESSURE: 138 MMHG

## 2019-05-22 DIAGNOSIS — L03.119 CELLULITIS OF FOOT: ICD-10-CM

## 2019-05-22 DIAGNOSIS — S91.302A OPEN WOUND OF LEFT FOOT EXCLUDING ONE OR MORE TOES, INITIAL ENCOUNTER: ICD-10-CM

## 2019-05-22 DIAGNOSIS — Z23 NEED FOR VACCINATION: ICD-10-CM

## 2019-05-22 DIAGNOSIS — L03.119 CELLULITIS OF FOOT: Primary | ICD-10-CM

## 2019-05-22 PROCEDURE — 90715 TDAP VACCINE 7 YRS/> IM: CPT | Performed by: FAMILY MEDICINE

## 2019-05-22 PROCEDURE — 73630 X-RAY EXAM OF FOOT: CPT | Mod: LT

## 2019-05-22 PROCEDURE — 99214 OFFICE O/P EST MOD 30 MIN: CPT | Mod: 25 | Performed by: FAMILY MEDICINE

## 2019-05-22 PROCEDURE — 90471 IMMUNIZATION ADMIN: CPT | Performed by: FAMILY MEDICINE

## 2019-05-22 ASSESSMENT — PAIN SCALES - GENERAL: PAINLEVEL: SEVERE PAIN (7)

## 2019-05-22 NOTE — NURSING NOTE
Chief Complaint   Patient presents with     Foot Pain     left foot, hurt it while she was gardening yesterday.     MP/MA

## 2019-05-22 NOTE — PROGRESS NOTES
Subjective     Charleen Childress is a 73 year old female who presents to clinic today for the following health issues:    HPI   Chief Complaint   Patient presents with     Foot Pain     left foot, hurt it while she was gardening yesterday.     Charleen is here today for left foot pain.  Stated that yesterday as she was digging as she was planting a tree, a small fork slipped and hit on the top of her left foot.  She had her boot on and did not think there was a cut through her boot.  She was having the pain immediately but was able to complete her task.  Pain get worse in the evening and through the night - throbbing that kept her up all night long.  She also noted blood on the socks.  The area of injury is red and swelling.  No drainage.  Pain is worse with weightbearing activities or walking - been limping.  Pain is better if she wrapped it tight and has been wrapping with the Ace wrap.  No fever or chills.  Not up-to-date for her immunization including the Tdap.  Otherwise healthy.  No other concerns.      Current Outpatient Medications   Medication Sig Dispense Refill     amoxicillin-clavulanate (AUGMENTIN) 875-125 MG tablet Take 1 tablet by mouth 2 times daily for 10 days 20 tablet 0     DiphenhydrAMINE HCl (BENADRYL PO)        IBUPROFEN PO As needed       Allergies   Allergen Reactions     No Known Drug Allergies          Reviewed and updated as needed this visit by Provider    Review of Systems   ROS COMP: Constitutional, HEENT, cardiovascular, pulmonary, gi and gu systems are negative, except as otherwise noted.      Objective    Temp 98.2  F (36.8  C) (Temporal)   Wt 68 kg (150 lb)   SpO2 98%   BMI 24.96 kg/m    Body mass index is 24.96 kg/m .  Physical Exam   GENERAL: healthy, alert and no distress   RESP: lungs clear to auscultation - no rales, rhonchi or wheezes  CV: regular rate and rhythm, no murmur, click or rub, strong pedal pulses bilaterally.    MS: no gross musculoskeletal defects noted.  Walk  with limping to the left foot.  The top of the left foot, an area of redness that is about 3 cm in diameter.  At its center, an open wound with black material noted.  No drainage or bleeding.  Tender and slightly warm to the palpation.  Ankle exam was normal.  Strong pedal pulses appreciated.      Diagnostic Test Results:  Labs reviewed in Epic    Reviewed the foot x-ray with patient.  No fracture or acute change noted.  No foreign body identified.  Pending for radiology report.    Assessment & Plan       ICD-10-CM    1. Cellulitis of foot L03.119 XR Foot Left G/E 3 Views     amoxicillin-clavulanate (AUGMENTIN) 875-125 MG tablet   2. Open wound of left foot excluding one or more toes, initial encounter S91.302A XR Foot Left G/E 3 Views     amoxicillin-clavulanate (AUGMENTIN) 875-125 MG tablet   3. Need for vaccination Z23 TDAP VACCINE (ADACEL) [47481.002]     1st  Administration  [79681]     Charleen is here today with the left foot pain that precipitated after she was hit by a dirty fork while gardening yesterday.  Exam showed a puncture wound with dark materials that was consistent with dirt that mixed with blood.  The wound was cleaned with alcohol.  The material was removed with a needle 25-gauge needle and .  Xray showed no acute fracture or foreign body.  Wound was clean with soap and irrigated with saline.  The redness was bordered with a permanent marker.  Her wound is considered to be unclean.  She is not up-to-date her for immunization.    Started her on Augmentin twice a day and was instructed to take it as prescribed.  Tylenol or Motrin as needed for pain.  Wound care discussed and recommended to keep it clean with soap water.  Tdap received today.  Side effect discussed.   Follow-up with her primary care provider if it persists or gets worse, especially if the redness is spreading outside of the marked border or develops fever.  She feels comfortable with the plan and all of her questions were  answered.  Will call her about the x-ray results once the radiology report is available.    She was recommended to follow-up for a physical with her primary care provider.    Return if symptoms worsen or fail to improve.    Adrianne Shrestha Mai, MD  [facility]

## 2019-05-23 ENCOUNTER — TELEPHONE (OUTPATIENT)
Dept: FAMILY MEDICINE | Facility: CLINIC | Age: 74
End: 2019-05-23

## 2019-05-23 NOTE — TELEPHONE ENCOUNTER
----- Message from Adrianne Shrestha Mai, MD sent at 5/22/2019  9:27 PM CDT -----  Please let patient know that her x-ray showed no broken bone or concerning finding.

## 2019-10-10 ENCOUNTER — TELEPHONE (OUTPATIENT)
Dept: FAMILY MEDICINE | Facility: OTHER | Age: 74
End: 2019-10-10

## 2019-10-10 NOTE — LETTER
[facility]  [address] [facility] [id number] [phone]        Charleen Childress  [address] [facility] [address]      October 10, 2019      Dear Charleen,    I care about your health and have reviewed your health plan, including your medical conditions, medication list, and lab results and am making recommendations based on this review, to better manage your health.    You are in particular need of attention regarding:  -Breast Cancer Screening  -Colon Cancer Screening  -Wellness (Physical) Visit   -Dexa scan    I am recommending that you:  -schedule a WELLNESS (Physical) APPOINTMENT with me.   I will check fasting labs the same day - nothing to eat except water and meds for 8-10 hours prior.  -schedule a MAMMOGRAM which is due. You can call  [phone] to schedule your mammogram.    -schedule a COLONOSCOPY.  Colon cancer is now the second leading cause of cancer-related deaths in the [place] for both men and women.  There are over 130,000 new cases and 50,000 deaths per year from colon cancer.  A recent study, which included patients ages 55 to 79 found 50,400 [place] deaths from colorectal cancer could have been prevented if patients had undergone a colonoscopy in the previous 10 years.    If you have not had a colonoscopy, we encourage you to schedule by contacting us at [phone], Monday through Friday.  After hours, you may leave a message and we will return your call during normal business hours.      There is another option called a FIT test, if you don t wish to have a colonoscopy, which needs to be repeated every year.  It does replace the colonoscopy for colorectal cancer screening and can detect hidden bleeding in the lower colon.  If a positive result is obtained, you would be referred for a colonoscopy. Please discuss this option with your provider.      For patients under/uninsured, we recommend you contact the [employer] Scopes program. [employer] is a free  colorectal cancer screening program that provides colonoscopies for eligible under/uninsured [place] men and women. If you are interested in receiving a free colonoscopy, please call [facility] at [phone] (mention code ScopesWeb) to see if you re eligible.   -schedule a BONE DENSITY TEST (DEXA scan) which is due.    If you've had the preventative screening completed at another facility or feel you're not due for this screening, please call our clinic at the number listed above or send us a My Chart message so we can update our records. We would like to thank you in advance for taking the time to take care of your health.  If you have any questions, please don t hesitate to contact our clinic.    Sincerely,       Your [facility] Team

## 2019-12-03 ENCOUNTER — DOCUMENTATION ONLY (OUTPATIENT)
Dept: OTHER | Facility: CLINIC | Age: 74
End: 2019-12-03

## 2020-11-03 ENCOUNTER — HOSPITAL ENCOUNTER (OUTPATIENT)
Dept: GENERAL RADIOLOGY | Facility: CLINIC | Age: 75
Discharge: HOME | End: 2020-11-03
Attending: PHYSICIAN ASSISTANT | Admitting: PHYSICIAN ASSISTANT
Payer: MEDICARE

## 2020-11-03 ENCOUNTER — OFFICE VISIT (OUTPATIENT)
Dept: FAMILY MEDICINE | Facility: CLINIC | Age: 75
End: 2020-11-03
Payer: COMMERCIAL

## 2020-11-03 VITALS
TEMPERATURE: 98.1 F | DIASTOLIC BLOOD PRESSURE: 78 MMHG | BODY MASS INDEX: 26.02 KG/M2 | HEART RATE: 72 BPM | RESPIRATION RATE: 20 BRPM | OXYGEN SATURATION: 97 % | HEIGHT: 64 IN | WEIGHT: 152.4 LBS | SYSTOLIC BLOOD PRESSURE: 136 MMHG

## 2020-11-03 DIAGNOSIS — R29.898 WEAKNESS OF BOTH HANDS: ICD-10-CM

## 2020-11-03 DIAGNOSIS — M25.50 MULTIPLE JOINT PAIN: ICD-10-CM

## 2020-11-03 DIAGNOSIS — R20.0 NUMBNESS AND TINGLING IN BOTH HANDS: ICD-10-CM

## 2020-11-03 DIAGNOSIS — R20.2 NUMBNESS AND TINGLING IN BOTH HANDS: ICD-10-CM

## 2020-11-03 DIAGNOSIS — M25.50 MULTIPLE JOINT PAIN: Primary | ICD-10-CM

## 2020-11-03 LAB
ALBUMIN SERPL-MCNC: 3.8 G/DL (ref 3.4–5)
ALP SERPL-CCNC: 67 U/L (ref 40–150)
ALT SERPL W P-5'-P-CCNC: 19 U/L (ref 0–50)
ANION GAP SERPL CALCULATED.3IONS-SCNC: 3 MMOL/L (ref 3–14)
AST SERPL W P-5'-P-CCNC: 20 U/L (ref 0–45)
BASOPHILS # BLD AUTO: 0.1 10E9/L (ref 0–0.2)
BASOPHILS NFR BLD AUTO: 1 %
BILIRUB SERPL-MCNC: 0.6 MG/DL (ref 0.2–1.3)
BUN SERPL-MCNC: 18 MG/DL (ref 7–30)
CALCIUM SERPL-MCNC: 9.2 MG/DL (ref 8.5–10.1)
CHLORIDE SERPL-SCNC: 109 MMOL/L (ref 94–109)
CO2 SERPL-SCNC: 29 MMOL/L (ref 20–32)
CREAT SERPL-MCNC: 0.86 MG/DL (ref 0.52–1.04)
CRP SERPL-MCNC: <2.9 MG/L (ref 0–8)
DIFFERENTIAL METHOD BLD: NORMAL
EOSINOPHIL NFR BLD AUTO: 9.1 %
ERYTHROCYTE [DISTWIDTH] IN BLOOD BY AUTOMATED COUNT: 13.4 % (ref 10–15)
ERYTHROCYTE [SEDIMENTATION RATE] IN BLOOD BY WESTERGREN METHOD: 6 MM/H (ref 0–30)
GFR SERPL CREATININE-BSD FRML MDRD: 65 ML/MIN/{1.73_M2}
GLUCOSE SERPL-MCNC: 88 MG/DL (ref 70–99)
HCT VFR BLD AUTO: 39.2 % (ref 35–47)
HGB BLD-MCNC: 12.5 G/DL (ref 11.7–15.7)
IMM GRANULOCYTES # BLD: 0 10E9/L (ref 0–0.4)
IMM GRANULOCYTES NFR BLD: 0.4 %
LYMPHOCYTES # BLD AUTO: 1.4 10E9/L (ref 0.8–5.3)
LYMPHOCYTES NFR BLD AUTO: 28.7 %
MCH RBC QN AUTO: 30.3 PG (ref 26.5–33)
MCHC RBC AUTO-ENTMCNC: 31.9 G/DL (ref 31.5–36.5)
MCV RBC AUTO: 95 FL (ref 78–100)
MONOCYTES # BLD AUTO: 0.4 10E9/L (ref 0–1.3)
MONOCYTES NFR BLD AUTO: 7.9 %
NEUTROPHILS # BLD AUTO: 2.6 10E9/L (ref 1.6–8.3)
NEUTROPHILS NFR BLD AUTO: 52.9 %
NRBC # BLD AUTO: 0 10*3/UL
NRBC BLD AUTO-RTO: 0 /100
PLATELET # BLD AUTO: 193 10E9/L (ref 150–450)
POTASSIUM SERPL-SCNC: 4.2 MMOL/L (ref 3.4–5.3)
PROT SERPL-MCNC: 7.4 G/DL (ref 6.8–8.8)
RBC # BLD AUTO: 4.12 10E12/L (ref 3.8–5.2)
SODIUM SERPL-SCNC: 141 MMOL/L (ref 133–144)
WBC # BLD AUTO: 5 10E9/L (ref 4–11)

## 2020-11-03 PROCEDURE — 85025 COMPLETE CBC W/AUTO DIFF WBC: CPT | Performed by: PHYSICIAN ASSISTANT

## 2020-11-03 PROCEDURE — 86200 CCP ANTIBODY: CPT | Performed by: PHYSICIAN ASSISTANT

## 2020-11-03 PROCEDURE — 99214 OFFICE O/P EST MOD 30 MIN: CPT | Performed by: PHYSICIAN ASSISTANT

## 2020-11-03 PROCEDURE — 85652 RBC SED RATE AUTOMATED: CPT | Performed by: PHYSICIAN ASSISTANT

## 2020-11-03 PROCEDURE — 86431 RHEUMATOID FACTOR QUANT: CPT | Performed by: PHYSICIAN ASSISTANT

## 2020-11-03 PROCEDURE — 73120 X-RAY EXAM OF HAND: CPT | Mod: 50

## 2020-11-03 PROCEDURE — 36415 COLL VENOUS BLD VENIPUNCTURE: CPT | Performed by: PHYSICIAN ASSISTANT

## 2020-11-03 PROCEDURE — 80053 COMPREHEN METABOLIC PANEL: CPT | Performed by: PHYSICIAN ASSISTANT

## 2020-11-03 PROCEDURE — 86140 C-REACTIVE PROTEIN: CPT | Performed by: PHYSICIAN ASSISTANT

## 2020-11-03 ASSESSMENT — PAIN SCALES - GENERAL: PAINLEVEL: MODERATE PAIN (5)

## 2020-11-03 ASSESSMENT — MIFFLIN-ST. JEOR: SCORE: 1158.34

## 2020-11-03 NOTE — NURSING NOTE
Health Maintenance Due   Topic Date Due     DEXA  1945     COLORECTAL CANCER SCREENING  10/17/1954     HEPATITIS C SCREENING  10/17/1962     ZOSTER IMMUNIZATION (1 of 2) 10/17/1994     LIPID  03/28/2007     Pneumococcal Vaccine: 65+ Years (1 of 1 - PPSV23) 10/17/2009     MEDICARE ANNUAL WELLNESS VISIT  05/27/2011     PHQ-2  01/01/2020     FALL RISK ASSESSMENT  05/22/2020     INFLUENZA VACCINE (1) 09/01/2020     Tatianna WASHBURN LPN

## 2020-11-03 NOTE — PATIENT INSTRUCTIONS
Patient Education     Arthralgia    Arthralgia is the term for pain in or around the joint. It is a symptom, not a disease. This pain may involve one or more joints. In some cases, the pain moves from joint to joint.  There are many causes for joint pain. These include:    Injury    Wearing out the joint surface (osteoarthritis)    Inflammation of the joint because of crystals in the joint fluid (gout)    Infection inside the joint      Inflammation of the fluid-filled sacs around the joint (bursitis)    Autoimmune disorders such as rheumatoid arthritis or lupus    Inflammation of chords that attach muscle to bone (tendonitis)  Home care    Rest the involved joint(s) until your symptoms improve.     Eat a healthy diet, exercise as advised by your healthcare provider and stay at a healthy weight    You may be prescribed pain medicine. If none is prescribed, you may use acetaminophen or ibuprofen to control pain and inflammation.    Follow-up care  Follow up with your healthcare provider or as advised.  When to seek medical advice  Call your healthcare provider right away if any of the following occurs:    Pain, swelling, or redness of joint increases    Pain worsens or recurs after a period of improvement    Pain moves to other joints    You cannot bear weight on the affected joint     You cannot move the affected joint    Joint appears deformed    New rash appears    Fever of 100.4 F (38 C) or higher, or as directed by your healthcare provider    New symptoms appear  Bridget last reviewed this educational content on 8/1/2019 2000-2020 [employer]. [address] [place] [address], [place] [address]. All rights reserved. This information is not intended as a substitute for professional medical care. Always follow your healthcare professional's instructions.

## 2020-11-03 NOTE — PROGRESS NOTES
Subjective     Charleen Childress is a 75 year old female who presents to clinic today for the following health issues:    HPI         Concern - bilateral hands swelling  Onset: 1 year  Description: swelling  Intensity: mild  Progression of Symptoms:  same  Accompanying Signs & Symptoms: tingling, stiff, loss of strength, numbness  Previous history of similar problem: YES  Precipitating factors:        Worsened by: cold and not using the hands  Alleviating factors:        Improved by: bio-freeze  Therapies tried and outcome: bio-freeze; good relief    Patient is a 75 year old female who presents today with complaint of multiple joint pain, weakness in her hands and numbness in her wrists and fingers. She says that the symptoms have been present for at least a year and that she has been managing them with advil, warm water and warm wax exposure, and laser therapy at a clinic in [facility] called [facility]. She says that the pain is worse in the MCP joints of her hands, but also affects her neck and low back. Her hands have been red, tender, stiff and swollen in the mornings which seems to improve with use. However, she feels that her  strength and fine movements has decreased. She provided the examples of being unable to work a nail clipper, hold a needle for sewing or hold onto a steering wheel for prolonged periods of time. She says that when she does drive her hands will become numb and she will have to alternate resting/repositioning a hand to reduce symptoms. She recalls that her mother had similar joint pain in her hands and that she had irregular knots in the joints of the hands. Denies chest pain/pressure, trouble breathing, changes to bowel/bladder, dizziness/lightheadedness, changes to hearing/vision, difficulty swallowing, rash or discoloration of skin, injury or recent illness.     Review of Systems   Constitutional, HEENT, cardiovascular, pulmonary, gi and gu systems are negative, except as  "otherwise noted.      Objective    /78 (BP Location: Left arm, Patient Position: Chair, Cuff Size: Adult Large)   Pulse 72   Temp 98.1  F (36.7  C) (Temporal)   Resp 20   Ht 1.613 m (5' 3.5\")   Wt 69.1 kg (152 lb 6.4 oz)   SpO2 97%   BMI 26.57 kg/m    Body mass index is 26.57 kg/m .  Physical Exam   GENERAL: healthy, alert and no distress  EYES: Eyes grossly normal to inspection, PERRL and conjunctivae and sclerae normal  NECK: no adenopathy, no asymmetry, masses, or scars and thyroid normal to palpation  RESP: lungs clear to auscultation - no rales, rhonchi or wheezes  CV: regular rate and rhythm, normal S1 S2, no S3 or S4, no murmur, click or rub, no peripheral edema and peripheral pulses strong  MS: no gross musculoskeletal defects noted, mildly swollen MCP and interphalangeal joints bilaterally, tenderness to palpation over MCP joints, R>L. Reduced  strength bilaterally. Positive tinel test bilaterally. Normal AROM of fingers/wrists/elbows/shoulders and neck.   NEURO: Normal strength and tone, mentation intact and speech normal  PSYCH: mentation appears normal, affect normal/bright    Results for orders placed or performed in visit on 11/03/20 (from the past 24 hour(s))   CBC with platelets and differential   Result Value Ref Range    WBC 5.0 4.0 - 11.0 10e9/L    RBC Count 4.12 3.8 - 5.2 10e12/L    Hemoglobin 12.5 11.7 - 15.7 g/dL    Hematocrit 39.2 35.0 - 47.0 %    MCV 95 78 - 100 fl    MCH 30.3 26.5 - 33.0 pg    MCHC 31.9 31.5 - 36.5 g/dL    RDW 13.4 10.0 - 15.0 %    Platelet Count 193 150 - 450 10e9/L    Diff Method Automated Method     % Neutrophils 52.9 %    % Lymphocytes 28.7 %    % Monocytes 7.9 %    % Eosinophils 9.1 %    % Basophils 1.0 %    % Immature Granulocytes 0.4 %    Nucleated RBCs 0 0 /100    Absolute Neutrophil 2.6 1.6 - 8.3 10e9/L    Absolute Lymphocytes 1.4 0.8 - 5.3 10e9/L    Absolute Monocytes 0.4 0.0 - 1.3 10e9/L    Absolute Basophils 0.1 0.0 - 0.2 10e9/L    Abs Immature " Granulocytes 0.0 0 - 0.4 10e9/L    Absolute Nucleated RBC 0.0      Xray - Awaiting final interpretation         Assessment & Plan     Multiple joint pain  Discussed with patient concern for RA or similar joint disease. Imaging returned with possible mild joint erosion. Will await final interpretation. Patient given braces to wear at night while sleeping to help with numbness and tingling. Though, I am less suspicious of carpal tunnel as the primary cause and more as secondary to joint disease. Patient will have labs checked. Will update with results. Consider rheumatological consult pending results.   - CBC with platelets and differential  - Comprehensive metabolic panel (BMP + Alb, Alk Phos, ALT, AST, Total. Bili, TP)  - CRP, inflammation  - ESR: Erythrocyte sedimentation rate  - Rheumatoid factor  - Cyclic Citrullinated Peptide Antibody IgG  - XR Hand Bilateral 2 Views; Future    Weakness of both hands  Wear bracing at night while sleeping. Continue with heat treatments and as needed OTC analgesic as needed. Consider prednisone burst pending lab results.   - CBC with platelets and differential  - Comprehensive metabolic panel (BMP + Alb, Alk Phos, ALT, AST, Total. Bili, TP)  - CRP, inflammation  - ESR: Erythrocyte sedimentation rate  - Rheumatoid factor  - Cyclic Citrullinated Peptide Antibody IgG  - XR Hand Bilateral 2 Views; Future    Numbness and tingling in both hands  See above.   - CBC with platelets and differential  - Comprehensive metabolic panel (BMP + Alb, Alk Phos, ALT, AST, Total. Bili, TP)  - CRP, inflammation  - ESR: Erythrocyte sedimentation rate  - Rheumatoid factor  - Cyclic Citrullinated Peptide Antibody IgG  - XR Hand Bilateral 2 Views; Future         Return in about 6 months (around 5/3/2021).    RACH [facility]

## 2020-11-04 DIAGNOSIS — M25.50 MULTIPLE JOINT PAIN: Primary | ICD-10-CM

## 2020-11-04 LAB
CCP AB SER IA-ACNC: 1 U/ML
RHEUMATOID FACT SER NEPH-ACNC: 12 IU/ML (ref 0–20)

## 2020-11-05 ENCOUNTER — TELEPHONE (OUTPATIENT)
Dept: FAMILY MEDICINE | Facility: CLINIC | Age: 75
End: 2020-11-05

## 2020-11-05 NOTE — TELEPHONE ENCOUNTER
----- Message from Gavino Sauceda PA-C sent at 11/4/2020  5:59 PM CST -----  Please call with results.  Please inform the patient that the labs for the rheumatoid factor returned abnormal and the radiologist agreed with what we discussed at her appointment, possible early signs of erosion in the joints. I have placed a referral to have her meet with a rheumatologist. She can schedule this appointment by calling [phone].      Gavino Sauceda PA-C on 11/4/2020 at 5:51 PM

## 2020-11-19 NOTE — PROGRESS NOTES
RHEUMATOLOGY INITIAL CONSULT NOTE    Chief Complaint:  polyarthralgia    Reason for consult: Gavino Sauceda from  has requested consultation for this patient for polyarthralgia    History of Present Illness:    Charleen Childress is a 75 year old female with pmhx of allergies, rib fractures, hiatal hernia is referred to rheumatology clinic for polyarthralgia. She reports having R arm pain radiating from her neck. She gets pain into her hands b/l but pain is mostly in her forearm radiating down. Pain starts at the forearm and goes into the volar side of her arm into the palms. Pain is only there at night. She gets throbbing pain which prevents her from sleeping. 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 She has been getting laser therapy at [facility] for neuropathy (numbness in all her digits) in her hands and feet, which helps her. She goes once a month.  She reports some swelling in both the hands b/l. She does not have any problems during the day and she can use her hands just fine without any pain. However, the throbbing pain bothers her at night and wakes her up from sleep. She has been getting massage therapy and chiropractic care over the back. She has hx of falling 2 years ago after slipping on ice and was told she has 3 broken ribs. Her pain in the back is over the same location as her trauma. Her pain is worst when she lays on her back at night. She has numbness in her hands. She has chronic R knee injury and gets occasional pain in the knee but denies any swelling in the knees or ankles, feet, wrists. Denies hip pain or shoulder pain. Denies any lower back pain, but occasionally when she lifts something, she gets back pain. She takes naproxen BID for pain which does help her little.     Denies recent travel or sick contacts.    Pertinent labs and imaging: (per chart review in [facility] and care everywhere)    Labs: RF of 12, negative CCP, ESR, CRP    Imaging: x-ray of hands showing  possible early erosion of R 3rd DIP    Past Medical History:    -allergies  -rib fractures  -hiatal hernia    Past Surgical History:   Past Surgical History:   Procedure Laterality Date     C APPENDECTOMY       C LIGATE FALLOPIAN TUBE,POSTPARTUM      Tubal Ligation     C VAGINAL HYSTERECTOMY  1981    Hysterectomy, Vaginal     Family History:   Family History   Problem Relation Age of Onset     Cerebrovascular Disease Mother      Diabetes Mother      Cancer Father      Mother: RA, because of which she had a shoulder replacement  Denies any known hx of PsA, ankylosing spondylitis, lupus, Sjogren's syndrome     Social History:   Social History     Socioeconomic History     Marital status:      Spouse name: Not on file     Number of children: Not on file     Years of education: Not on file     Highest education level: Not on file   Occupational History     Not on file   Social Needs     Financial resource strain: Not on file     Food insecurity     Worry: Not on file     Inability: Not on file     Transportation needs     Medical: Not on file     Non-medical: Not on file   Tobacco Use     Smoking status: Never Smoker     Smokeless tobacco: Never Used   Substance and Sexual Activity     Alcohol use: Yes     Comment: seldom     Drug use: No     Sexual activity: Never   Lifestyle     Physical activity     Days per week: Not on file     Minutes per session: Not on file     Stress: Not on file   Relationships     Social connections     Talks on phone: Not on file     Gets together: Not on file     Attends Mosque service: Not on file     Active member of club or organization: Not on file     Attends meetings of clubs or organizations: Not on file     Relationship status: Not on file     Intimate partner violence     Fear of current or ex partner: Not on file     Emotionally abused: Not on file     Physically abused: Not on file     Forced sexual activity: Not on file   Other Topics Concern     Not on file   Social  "History Narrative     Not on file     Alcohol use: none  Tobacco use: never  Occupational hx: retired, 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    Allergies   Allergen Reactions     No Known Drug Allergies       Immunization History   Administered Date(s) Administered     TDAP Vaccine (Adacel) 05/22/2019        Medications:  Current Outpatient Medications   Medication Sig Dispense Refill     naproxen sodium 220 MG capsule Take 220 mg by mouth 2 times daily (with meals)         REVIEW OF SYSTEMS:  Constitutional: Denies fevers, chills, fatigue, weight loss or night sweats  HEENT: Denies headache, blurry vision, redness, drainage, dry eyes, dry mouth, oral/nasal ulcers, hair loss/thinning, tinnitus, hearing loss  Dermatologic: Denies skin rash, raynaud's  Respiratory: Denies cough, shortness of breath, pleurisy  Cardiovascular: Denies chest pain, edema, lightheadedness, dizziness  Gastrointestinal: +occasional heartburn, denies difficulty swallowing, abdominal pain, nausea, vomiting, diarrhea, constipation, hematochezia or melena  Genitourinary: Denies dysuria or hematuria  Musculoskeletal: see HPI, denies back pain, dactylitis, enthesitis  Neurologic: +numbness/tingling in b/l hands, denies weakness, falls    PHYSICAL EXAMINATION:   Vital signs:    BP (!) 159/71 (BP Location: Left arm, Patient Position: Sitting, Cuff Size: Adult Regular)   Pulse 54   Temp 98.3  F (36.8  C) (Oral)   Ht 1.626 m (5' 4\")   Wt 69.4 kg (153 lb)   SpO2 100%   BMI 26.26 kg/m      Gen: alert, awake, NAD  Skin: warm, dry, no rashes  HEENT: EOMI, PERRL, no alopecia  CV: RRR, normal s1 and s2, no m/r/g  Pulm: CTAB, non-labored respirations, no c/r/w  GI: +BS, soft, no TTP  MSK: bone hypertrophy over DIP joints bilaterally, no tenderness over any joints, no joint effusions, no synovitis of any joints, no SI joint tenderness  Neuro: A&O x3, no focal deficits  Psych: pleasant, cooperative    Labs:      RF/CCP  Recent Labs   Lab " Test 11/03/20  0900   CCPIGG 1   RHF 12*     CBC  Recent Labs   Lab Test 11/03/20  0900   WBC 5.0   RBC 4.12   HGB 12.5   HCT 39.2   MCV 95   RDW 13.4      MCH 30.3   MCHC 31.9   NEUTROPHIL 52.9   LYMPH 28.7   MONOCYTE 7.9   EOSINOPHIL 9.1   BASOPHIL 1.0   ANEU 2.6   ALYM 1.4   GRISELDA 0.4   ABAS 0.1     CMP  Recent Labs   Lab Test 11/03/20  0900      POTASSIUM 4.2   CHLORIDE 109   CO2 29   ANIONGAP 3   GLC 88   BUN 18   CR 0.86   GFRESTIMATED 65   GFRESTBLACK 76   ALEM 9.2   BILITOTAL 0.6   ALBUMIN 3.8   PROTTOTAL 7.4   ALKPHOS 67   AST 20   ALT 19     Calcium/VitaminD  Recent Labs   Lab Test 11/03/20  0900   ALEM 9.2     ESR/CRP  Recent Labs   Lab Test 11/03/20  0900   SED 6   CRP <2.9     UA  Recent Labs   Lab Test 11/23/16   COLOR Straw   APPEARANCE Cloudy   URINEGLC Neg   URINEBILI Neg   SG 1.010   URINEPH 6.0   PROTEIN Trace   UROBILINOGEN 0.2   NITRITE Neg   UBLD Large   LEUKEST Large       Imaging:   X-ray of hands (11/3/2020)  IMPRESSION:   1. Right hand: No acute bony or soft tissue abnormality. No  significant osteoarthritis. There is a possible early nonspecific  erosive change involving the radial distal aspect of the middle  phalanx of the third finger.     2. Left hand: A metallic ring partially obscures the fourth proximal  phalanx diaphysis. No acute bony abnormality. Early osteoarthritis at  the first carpometacarpal joint. No periarticular erosions,  periarticular osteopenia, subluxations or capsular swelling to  indicate synovial-based arthritis.  Assessment / Plan:    Charleen Childress is a very pleasant 75 year old female with pmhx of allergies, rib fractures, hiatal hernia is referred to rheumatology clinic for hand pain. Pertinent work-up thus far includes RF of 12 (normal is <12), negative CCP, ESR, CRP. Plain films of her hands show possible nonspecific erosive change over R 3rd DIP joint. Her symptoms sound neurologic in nature given the distribution and pattern of her pain and pain  only present at night when she lays on her back. She has hx of CTS and trauma last year when she got rib fractures. I do not think she has RA. RA does not typically involve the DIP joint, very slight elevation of RF of 12 (normal <12) is likely a false positive. She has no pain/discomfort in her hands during the day. We discussed that RA is unlikely. She does not have any s/s of spondyloarthritis which can affect the DIP joints. I advised her to discuss with her PCP regarding getting an EMG for further evaluation.    1) R forearm pain radiating into the palm  -likely neurologic  -advised pt to discuss with PCP regarding getting an EMG and trying gabapentin  -discussed trying wrist/forearm splint, which she has been trying but it is not fully effective     2) elevated blood pressure  -pt reports feeling stressed this AM, asymptomatic  -advised pt to monitor her BP at home and keep a log and inform PCP   -she is on naproxen which can be contributing to her elevated BP  -she states naproxen is not too helpful. Recommended discussing with PCP regarding discontinuing if it is not helping much, and associated long-term side effects     3) mildly elevated RF  -unclear significance, negative CCP. No evidence of RA on exam or plain films  -she will let me know if she develops any new joint symptoms including pain, stiffness, swelling    RTC PRN      Kelley Benedict MD

## 2020-11-20 ENCOUNTER — OFFICE VISIT (OUTPATIENT)
Dept: RHEUMATOLOGY | Facility: CLINIC | Age: 75
End: 2020-11-20
Attending: PHYSICIAN ASSISTANT
Payer: COMMERCIAL

## 2020-11-20 VITALS
BODY MASS INDEX: 26.12 KG/M2 | WEIGHT: 153 LBS | TEMPERATURE: 98.3 F | SYSTOLIC BLOOD PRESSURE: 159 MMHG | HEART RATE: 54 BPM | DIASTOLIC BLOOD PRESSURE: 71 MMHG | HEIGHT: 64 IN | OXYGEN SATURATION: 100 %

## 2020-11-20 DIAGNOSIS — M25.50 MULTIPLE JOINT PAIN: ICD-10-CM

## 2020-11-20 PROCEDURE — 99203 OFFICE O/P NEW LOW 30 MIN: CPT | Performed by: STUDENT IN AN ORGANIZED HEALTH CARE EDUCATION/TRAINING PROGRAM

## 2020-11-20 RX ORDER — COVID-19 ANTIGEN TEST
220 KIT MISCELLANEOUS 2 TIMES DAILY WITH MEALS
COMMUNITY
End: 2023-05-22

## 2020-11-20 ASSESSMENT — MIFFLIN-ST. JEOR: SCORE: 1169

## 2020-11-20 NOTE — PATIENT INSTRUCTIONS
1. Please talk to your primary care about trying gabapentin for nerve pain. They can also order an EMG to look for nerve irritation  2. Please monitor your blood pressure as it was high today. Please talk to your primary care doctor if it stays high  3. Follow-up as needed

## 2020-11-20 NOTE — NURSING NOTE
"Charleen Childress's goals for this visit include:   Chief Complaint   Patient presents with     New Patient     multiple joint pain       She requests these members of her care team be copied on today's visit information:     PCP: Gavino Sauceda    Referring Provider:  Gavino Sauceda PA-C  [address] [place] [address]    BP (!) 159/71 (BP Location: Left arm, Patient Position: Sitting, Cuff Size: Adult Regular)   Pulse 54   Temp 98.3  F (36.8  C) (Oral)   Ht 1.626 m (5' 4\")   Wt 69.4 kg (153 lb)   SpO2 100%   BMI 26.26 kg/m      Do you need any medication refills at today's visit? No    Selam Gibbs CMA      "

## 2020-11-23 ENCOUNTER — TELEPHONE (OUTPATIENT)
Dept: FAMILY MEDICINE | Facility: CLINIC | Age: 75
End: 2020-11-23

## 2020-11-23 NOTE — TELEPHONE ENCOUNTER
Reason for Call:  Other      Detailed comments: Would like to follow up with you after her visit with the rheumatologist and would like to know what the next step is.     Phone Number Patient can be reached at: Home number on file [phone] (home)    Best Time: any     Can we leave a detailed message on this number? YES    Call taken on 11/23/2020 at 10:47 AM by Bailey Avalos

## 2020-11-24 NOTE — TELEPHONE ENCOUNTER
Please help her to schedule a visit with me. Can be in office or virtual and I will go over the rheumatology results as well as next steps for addressing her pain.     Gavino Sauceda PA-C on 11/24/2020 at 4:49 PM

## 2020-12-01 ENCOUNTER — OFFICE VISIT (OUTPATIENT)
Dept: FAMILY MEDICINE | Facility: CLINIC | Age: 75
End: 2020-12-01
Payer: COMMERCIAL

## 2020-12-01 VITALS
SYSTOLIC BLOOD PRESSURE: 138 MMHG | TEMPERATURE: 96.8 F | RESPIRATION RATE: 16 BRPM | OXYGEN SATURATION: 98 % | HEART RATE: 56 BPM | DIASTOLIC BLOOD PRESSURE: 80 MMHG | WEIGHT: 154 LBS | BODY MASS INDEX: 26.43 KG/M2

## 2020-12-01 DIAGNOSIS — R29.898 WEAKNESS OF BOTH HANDS: ICD-10-CM

## 2020-12-01 DIAGNOSIS — M25.50 MULTIPLE JOINT PAIN: ICD-10-CM

## 2020-12-01 DIAGNOSIS — R20.0 NUMBNESS AND TINGLING IN BOTH HANDS: Primary | ICD-10-CM

## 2020-12-01 DIAGNOSIS — R20.2 NUMBNESS AND TINGLING IN BOTH HANDS: Primary | ICD-10-CM

## 2020-12-01 PROCEDURE — 99214 OFFICE O/P EST MOD 30 MIN: CPT | Performed by: PHYSICIAN ASSISTANT

## 2020-12-01 RX ORDER — GABAPENTIN 300 MG/1
CAPSULE ORAL
Qty: 57 CAPSULE | Refills: 0 | Status: SHIPPED | OUTPATIENT
Start: 2020-12-01 | End: 2023-05-22

## 2020-12-01 ASSESSMENT — PAIN SCALES - GENERAL: PAINLEVEL: EXTREME PAIN (8)

## 2020-12-01 NOTE — PROGRESS NOTES
Subjective     Charleen Childress is a 75 year old female who presents to clinic today for the following health issues:    HPI         Concern - right arm pain  Onset: 1 month  Description: throbbing, stinging  Intensity: severe  Progression of Symptoms:  worsening  Accompanying Signs & Symptoms: tingling in her hands  Previous history of similar problem: no  Precipitating factors:        Worsened by: worse at night  Alleviating factors:        Improved by: nothing  Therapies tried and outcome: Heat and ice, no relief; hand braces, sligtht relief    Patient is a 75 year old female who presents today for follow up of recent rheumatology visit. She was seen on 11/20/2020 due to suspicion of possible RA due to findings of borderline positive rheumatoid factor and early nonspecific erosive changes in the radial distal aspect of right third finger. Review of rheumatology note showed that the patient had a grossly normal exam, rheumatologist did not feel that her symptoms were due to rheumatoid arthritis. Patient informs me today that she has been using the wrist braces provided to her, but feels that they only help some of the time and occasionally cause her pain to be worse. The pain is worse in her left arm vs the right and seems to only occur at night. The patient describes sensation of throbbing pain that can occur in the upper or lower arm and into the wrist and fingers. This seems to occur 2 hours after laying down. Once pain occurs she will sit up, lean forward and cross her arms; allowing her neck and head to hang in flexion. She says that this position will relieve the symptoms. The patient informs me that she also sleeps with a rolled up towel under neck at the instruction of a chiropractor. If she does not have this rolled up towel her pain will be worse. She notes that the fingers of her left hand have been numb and both hands continue to feel weak. Denies pain during the day regardless of activity.     Review  of Systems   Constitutional, HEENT, cardiovascular, pulmonary, gi and gu systems are negative, except as otherwise noted.      Objective    /80 (BP Location: Right arm, Patient Position: Chair, Cuff Size: Adult Regular)   Pulse 56   Temp 96.8  F (36  C) (Temporal)   Resp 16   Wt 69.9 kg (154 lb)   SpO2 98%   BMI 26.43 kg/m    Body mass index is 26.43 kg/m .  Physical Exam   GENERAL: healthy, alert and no distress  RESP: lungs clear to auscultation - no rales, rhonchi or wheezes  CV: regular rate and rhythm, normal S1 S2, no S3 or S4, no murmur, click or rub, no peripheral edema and peripheral pulses strong  MS: no gross musculoskeletal defects noted, no edema, normal AROM of neck/shoulders/elbows/wrists/fingers  NEURO: Normal strength and tone, mentation intact and speech normal, reduced sensation to light touch over distal fingers of left hand  PSYCH: mentation appears normal, affect normal/bright    No results found for this or any previous visit (from the past 24 hour(s)).        Assessment & Plan        Numbness and tingling in both hands  Weakness of both hands  Multiple joint pain    Patient met with rheumatology who did did not feel that the pain was rheumatologic and advised that she return to primary care to discuss EMG. Patient informs me today that her symptoms only occur after 2 hours of laying down and improve with sitting up and leaning forward. Discussed EMG vs MRI of the cervical spine, currently denies neck pain. Will move forward with EMG. Patient will start gabapentin for symptom relief. Reviewed adverse effects of the medication and cautioned patient on the sedative effects. Advised to make certain she has a clear path to/from bathroom, does not drive while taking and follows the titration schedule. Continue to wear braces over wrists at night as able. Consider MRI of cervical spine pending EMG results.       ARCH Lund [facility]

## 2020-12-01 NOTE — PATIENT INSTRUCTIONS
Patient Education     Electromyogram  Your test is on (date) ________________ at (time) __________  What is an electromyogram (EMG)?  This test uses electrodes and a needle to measure the electrical signals of your nerves and muscles. It helps find the cause of weakness, spasms, numbness or pain in the arms, legs or face. The test helps diagnose muscle and nerve diseases.  How is the test done?  The test takes 30 to 60 minutes. Before the test, you will have a brief exam.  Part 1    We place electrodes on the skin.    An electric charge is sent along the nerve.    This allows us to measure how well the nerve carries the signal.  Part 2    The doctor inserts a fine needle into various muscle groups. There is no electric charge.    The doctor can see and hear the muscle's electrical activity on the monitors.  Will it hurt?  Most people handle the test without a problem. You may feel shocks or muscle twitches during Part 1, and you may feel pressure or pinching during part 2.  We do not prescribe medicine for this test. If you feel very anxious, call your regular doctor and discuss options to help you relax.  How do I get the results?  Your regular doctor will receive a report in about a week. You will need to call your doctor.  For informational purposes only. Not to replace the advice of your health care provider.  Copyright   2015 [employer]. All rights reserved. Clinically reviewed by [facility]. [employer] 582133 - REV 09/18.  For informational purposes only. Not to replace the advice of your health care provider.  Copyright   2018 [employer]. All rights reserved.

## 2020-12-01 NOTE — NURSING NOTE
Health Maintenance Due   Topic Date Due     COLORECTAL CANCER SCREENING  10/17/1954     HEPATITIS C SCREENING  10/17/1962     ZOSTER IMMUNIZATION (1 of 2) 10/17/1994     LIPID  03/28/2007     Pneumococcal Vaccine: 65+ Years (1 of 1 - PPSV23) 10/17/2009     MEDICARE ANNUAL WELLNESS VISIT  05/27/2011     INFLUENZA VACCINE (1) 09/01/2020     Tatianna WASHBURN LPN

## 2020-12-07 ENCOUNTER — TELEPHONE (OUTPATIENT)
Dept: FAMILY MEDICINE | Facility: CLINIC | Age: 75
End: 2020-12-07

## 2020-12-07 NOTE — TELEPHONE ENCOUNTER
Patient has not been contacted regarding her EMG, a referral was placed at her last office visit, she wanted to confirm that the referral was sent to Neurology.    I have given her the phone number for scheduling at [facility] Neurology.    Luiza Toribio XRO/

## 2021-01-08 ENCOUNTER — TRANSFERRED RECORDS (OUTPATIENT)
Dept: HEALTH INFORMATION MANAGEMENT | Facility: CLINIC | Age: 76
End: 2021-01-08

## 2021-01-18 ENCOUNTER — TELEPHONE (OUTPATIENT)
Dept: FAMILY MEDICINE | Facility: CLINIC | Age: 76
End: 2021-01-18

## 2021-01-18 DIAGNOSIS — G56.03 BILATERAL CARPAL TUNNEL SYNDROME: Primary | ICD-10-CM

## 2021-01-18 NOTE — TELEPHONE ENCOUNTER
JR    Patient calling to say that she had an EMG done on 1/8/21 at [place] and hasn't heard from anyone regarding results.-patient states she is still in pain.     If you could please call

## 2021-01-19 NOTE — TELEPHONE ENCOUNTER
I was able to locate a copy of the EMG results. The study showed findings consistent with carpal tunnel. I would like the patient to schedule with orthopedics. If she dos not hear from anyone in 1-2 days, she can call [phone].      Gavino Sauceda PA-C on 1/19/2021 at 12:27 PM

## 2021-01-19 NOTE — TELEPHONE ENCOUNTER
Patient notified she didn't have any thing to write on to get the number she will call back later if she needs it.  Radha Arriaza MA 1/19/2021

## 2021-01-21 ENCOUNTER — OFFICE VISIT (OUTPATIENT)
Dept: ORTHOPEDICS | Facility: CLINIC | Age: 76
End: 2021-01-21
Attending: PHYSICIAN ASSISTANT
Payer: COMMERCIAL

## 2021-01-21 VITALS
DIASTOLIC BLOOD PRESSURE: 80 MMHG | HEIGHT: 64 IN | SYSTOLIC BLOOD PRESSURE: 120 MMHG | BODY MASS INDEX: 26.38 KG/M2 | WEIGHT: 154.5 LBS

## 2021-01-21 DIAGNOSIS — G56.03 BILATERAL CARPAL TUNNEL SYNDROME: ICD-10-CM

## 2021-01-21 PROCEDURE — 99203 OFFICE O/P NEW LOW 30 MIN: CPT | Performed by: ORTHOPAEDIC SURGERY

## 2021-01-21 ASSESSMENT — PAIN SCALES - GENERAL: PAINLEVEL: NO PAIN (0)

## 2021-01-21 ASSESSMENT — MIFFLIN-ST. JEOR: SCORE: 1167.87

## 2021-01-21 NOTE — LETTER
"    1/21/2021         RE: Char[place] [address]  [address] [facility] [address]        Dear Colleague,    Thank you for referring your patient, Charleen Childress, to the [facility]. Please see a copy of my visit note below.    ORTHOPEDIC CONSULT      Chief Complaint: Charleen Childress is a 75 year old female who is being seen for   Chief Complaint   Patient presents with     Musculoskeletal Problem     bilateral hand numbness     Consult     ref: Gavino Sauceda       History of Present Illness:   Presents with the complaints of hand pain with constant numbness.  Right much worse than left.  She feels this dates back at least 5 years.  She initially difficulties driving a car due to a burning pain.  Throughout the course of the years the hands have become constantly numb.  Over last 5 years she has been treated with \"laser treatment\" 1 time a month which is helped the pain.  She wears braces at night and despite this has a \"throbbing burning achy\" pain.  She feels weak in her hands.  Unable to make a complete fist on the right side.      Patient's past medical, surgical, social and family histories reviewed.     No past medical history on file.    Past Surgical History:   Procedure Laterality Date     C APPENDECTOMY       C LIGATE FALLOPIAN TUBE,POSTPARTUM      Tubal Ligation     C VAGINAL HYSTERECTOMY  1981    Hysterectomy, Vaginal       Medications:       Chlorpheniramine Maleate (ALLERGY RELIEF PO), As needed       gabapentin (NEURONTIN) 300 MG capsule, Take 1 cap (300mg) at bed x3days, then increase to 2 cap (600mg) at bedtime (Patient not taking: Reported on 1/21/2021)       naproxen sodium 220 MG capsule, Take 220 mg by mouth 2 times daily (with meals)    No current facility-administered medications on file prior to visit.       Allergies   Allergen Reactions     No Known Drug Allergies        Social History     Occupational History     Not on file   Tobacco Use     Smoking status: Never " "Smoker     Smokeless tobacco: Never Used   Substance and Sexual Activity     Alcohol use: Yes     Comment: seldom     Drug use: No     Sexual activity: Never       Family History   Problem Relation Age of Onset     Cerebrovascular Disease Mother      Diabetes Mother      Cancer Father        REVIEW OF SYSTEMS  10 point review systems performed otherwise negative as noted as per history of present illness.    Physical Exam:  Vitals: /80   Ht 1.613 m (5' 3.5\")   Wt 70.1 kg (154 lb 8 oz)   BMI 26.94 kg/m    BMI= Body mass index is 26.94 kg/m .  Constitutional: healthy, alert and no acute distress   Psychiatric: mentation appears normal and affect normal/bright  NEURO: no focal deficits  RESP: Normal with easy respirations and no use of accessory muscles to breathe, no audible wheezing or retractions  CV: bilateral upper extemity:  no edema         Regular rate and rhythm by palpation  SKIN: No erythema, rashes, excoriation, or breakdown. No evidence of infection.   JOINT/EXTREMITIES:bilateral hands: Numbness throughout all the fingers to light touch.  Severe thenar atrophy with some question of first webspace atrophy as well.  The fingers are warm and dry with good capillary refill.  She does not unable to actively make a full fist and is able to bend the PIP and DIP joints of the right hand.  Passive she has full motion at the MCP joints.  Provocative testing limited due to constant numbness.   Lymph: No peripheral lymphedema  GAIT: not tested     Diagnostic Modalities:  bilateral hand X-ray:   1. Right hand: No acute bony or soft tissue abnormality. No  significant osteoarthritis. There is a possible early nonspecific  erosive change involving the radial distal aspect of the middle  phalanx of the third finger.    2. Left hand: A metallic ring partially obscures the fourth proximal  phalanx diaphysis. No acute bony abnormality. Early osteoarthritis at  the first carpometacarpal joint. No periarticular " erosions,  periarticular osteopenia, subluxations or capsular swelling to  indicate synovial-based arthritis.  EMG report dated January 8, 2021 done at the [facility] of neurology was reviewed.  It shows very severe sensorimotor bilateral median neuropathies at the wrist.  Superimposed chronic very mild right greater than left C8 cervical radiculopathy without active changes.  Previous imaging reviewed.  Independent visualization of the images was performed.      Impression: bilateral severe carpal tunnel syndrome, right greater than left  Chronic C8 radiculopathy    Plan:  All of the above pertinent physical exam and imaging modalities findings was reviewed with Charleen.    I reviewed her findings.  She has severe atrophy on exam which is consistent with her clinical symptoms as well as her EMG.  I discussed her treatment options.  I recommended surgery with a carpal tunnel release.  Plan to start with the right side and proceed with left is clinically improving.  We had a discussion regarding the severity of her symptoms.  She has constant numbness with severe atrophy.  This most likely will not improve with time but I would hope to eliminate her nighttime throbbing burning pain.    Other risk including bleeding infection skin related issues discussed.  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    Return to clinic 10 days post-operatively. , or sooner as needed for changes.  Re-x-ray on return: No    Ross Radford D.O.      Again, thank you for allowing me to participate in the care of your patient.        Sincerely,        Louis Radford, DO

## 2021-01-21 NOTE — PROGRESS NOTES
"ORTHOPEDIC CONSULT      Chief Complaint: Charleen Childress is a 75 year old female who is being seen for   Chief Complaint   Patient presents with     Musculoskeletal Problem     bilateral hand numbness     Consult     ref: Gavino Sauceda       History of Present Illness:   Presents with the complaints of hand pain with constant numbness.  Right much worse than left.  She feels this dates back at least 5 years.  She initially difficulties driving a car due to a burning pain.  Throughout the course of the years the hands have become constantly numb.  Over last 5 years she has been treated with \"laser treatment\" 1 time a month which is helped the pain.  She wears braces at night and despite this has a \"throbbing burning achy\" pain.  She feels weak in her hands.  Unable to make a complete fist on the right side.      Patient's past medical, surgical, social and family histories reviewed.     No past medical history on file.    Past Surgical History:   Procedure Laterality Date     C APPENDECTOMY       C LIGATE FALLOPIAN TUBE,POSTPARTUM      Tubal Ligation     C VAGINAL HYSTERECTOMY  1981    Hysterectomy, Vaginal       Medications:       Chlorpheniramine Maleate (ALLERGY RELIEF PO), As needed       gabapentin (NEURONTIN) 300 MG capsule, Take 1 cap (300mg) at bed x3days, then increase to 2 cap (600mg) at bedtime (Patient not taking: Reported on 1/21/2021)       naproxen sodium 220 MG capsule, Take 220 mg by mouth 2 times daily (with meals)    No current facility-administered medications on file prior to visit.       Allergies   Allergen Reactions     No Known Drug Allergies        Social History     Occupational History     Not on file   Tobacco Use     Smoking status: Never Smoker     Smokeless tobacco: Never Used   Substance and Sexual Activity     Alcohol use: Yes     Comment: seldom     Drug use: No     Sexual activity: Never       Family History   Problem Relation Age of Onset     Cerebrovascular Disease Mother      " "Diabetes Mother      Cancer Father        REVIEW OF SYSTEMS  10 point review systems performed otherwise negative as noted as per history of present illness.    Physical Exam:  Vitals: /80   Ht 1.613 m (5' 3.5\")   Wt 70.1 kg (154 lb 8 oz)   BMI 26.94 kg/m    BMI= Body mass index is 26.94 kg/m .  Constitutional: healthy, alert and no acute distress   Psychiatric: mentation appears normal and affect normal/bright  NEURO: no focal deficits  RESP: Normal with easy respirations and no use of accessory muscles to breathe, no audible wheezing or retractions  CV: bilateral upper extemity:  no edema         Regular rate and rhythm by palpation  SKIN: No erythema, rashes, excoriation, or breakdown. No evidence of infection.   JOINT/EXTREMITIES:bilateral hands: Numbness throughout all the fingers to light touch.  Severe thenar atrophy with some question of first webspace atrophy as well.  The fingers are warm and dry with good capillary refill.  She does not unable to actively make a full fist and is able to bend the PIP and DIP joints of the right hand.  Passive she has full motion at the MCP joints.  Provocative testing limited due to constant numbness.   Lymph: No peripheral lymphedema  GAIT: not tested     Diagnostic Modalities:  bilateral hand X-ray:   1. Right hand: No acute bony or soft tissue abnormality. No  significant osteoarthritis. There is a possible early nonspecific  erosive change involving the radial distal aspect of the middle  phalanx of the third finger.    2. Left hand: A metallic ring partially obscures the fourth proximal  phalanx diaphysis. No acute bony abnormality. Early osteoarthritis at  the first carpometacarpal joint. No periarticular erosions,  periarticular osteopenia, subluxations or capsular swelling to  indicate synovial-based arthritis.  EMG report dated January 8, 2021 done at the [facility] of neurology was reviewed.  It shows very severe sensorimotor bilateral median " neuropathies at the wrist.  Superimposed chronic very mild right greater than left C8 cervical radiculopathy without active changes.  Previous imaging reviewed.  Independent visualization of the images was performed.      Impression: bilateral severe carpal tunnel syndrome, right greater than left  Chronic C8 radiculopathy    Plan:  All of the above pertinent physical exam and imaging modalities findings was reviewed with Charleen.    I reviewed her findings.  She has severe atrophy on exam which is consistent with her clinical symptoms as well as her EMG.  I discussed her treatment options.  I recommended surgery with a carpal tunnel release.  Plan to start with the right side and proceed with left is clinically improving.  We had a discussion regarding the severity of her symptoms.  She has constant numbness with severe atrophy.  This most likely will not improve with time but I would hope to eliminate her nighttime throbbing burning pain.    Other risk including bleeding infection skin related issues discussed.  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    Return to clinic 10 days post-operatively. , or sooner as needed for changes.  Re-x-ray on return: Renee Radford D.O.

## 2021-01-28 ENCOUNTER — VIRTUAL VISIT (OUTPATIENT)
Dept: FAMILY MEDICINE | Facility: CLINIC | Age: 76
End: 2021-01-28
Payer: COMMERCIAL

## 2021-01-28 DIAGNOSIS — G56.03 BILATERAL CARPAL TUNNEL SYNDROME: Primary | ICD-10-CM

## 2021-01-28 PROCEDURE — 99207 PR NO CHARGE LOS: CPT | Mod: TEL | Performed by: PHYSICIAN ASSISTANT

## 2021-01-28 NOTE — PROGRESS NOTES
Charleen is a 75 year old who is being evaluated via a billable telephone visit.      What phone number would you like to be contacted at? [phone]  How would you like to obtain your AVS? Mail a copy  Assessment & Plan     Bilateral carpal tunnel syndrome  Patient was calling today as she had not heard from the orthopedic schedulers and her appointment with Dr. Radford was over a week ago. I opted not to charge her for today's appointment as no actual treatments were provided. I did contact the [facility] who will reach out to the orthopedic staff to begin scheduling her procedure.        RACH Lund [facility]     Charleen is a 75 year old who presents to clinic today for the following health issues   HPI       Concern - discuss hand pain  Onset: 3 months  Description: hand pain  Intensity: severe  Progression of Symptoms:  worsening  Accompanying Signs & Symptoms: none  Previous history of similar problem: no  Precipitating factors:        Worsened by: nothing  Alleviating factors:        Improved by: ice. Heat, brace  Therapies tried and outcome: heat, ice, brace, slight relief    See AP for full details. No changes to symptoms at this time. Pain is worse at night. Hands feel numb and more swollen after increased activity. Motivated to have surgery, but has not heard from the schedulers. This was the goal for today's appointment.     Review of Systems   Constitutional, HEENT, cardiovascular, pulmonary, gi and gu systems are negative, except as otherwise noted.      Objective           Vitals:  No vitals were obtained today due to virtual visit.    Physical Exam   healthy, alert and no distress  PSYCH: Alert and oriented times 3; coherent speech, normal   rate and volume, able to articulate logical thoughts, able   to abstract reason, no tangential thoughts, no hallucinations   or delusions  Her affect is normal  RESP: No cough, no audible wheezing, able to talk in full  sentences  Remainder of exam unable to be completed due to telephone visits         Phone call duration: 5 minutes

## 2021-01-28 NOTE — NURSING NOTE
Health Maintenance Due   Topic Date Due     DEXA  1945     COLORECTAL CANCER SCREENING  10[birth year]17/1954     HEPATITIS C SCREENING  10/17/1962     ZOSTER IMMUNIZATION (1 of 2) 10/17/1994     LIPID  03/28/2007     Pneumococcal Vaccine: 65+ Years (1 of 1 - PPSV23) 10/17/2009     MEDICARE ANNUAL WELLNESS VISIT  05/27/2011     INFLUENZA VACCINE (1) 09/01/2020     Tatianna WASHBURN LPN

## 2021-01-29 ENCOUNTER — TELEPHONE (OUTPATIENT)
Dept: ORTHOPEDICS | Facility: OTHER | Age: 76
End: 2021-01-29

## 2021-01-29 DIAGNOSIS — G56.01 CARPAL TUNNEL SYNDROME OF RIGHT WRIST: Primary | ICD-10-CM

## 2021-02-01 ENCOUNTER — TELEPHONE (OUTPATIENT)
Dept: ORTHOPEDICS | Facility: OTHER | Age: 76
End: 2021-02-01

## 2021-02-01 ENCOUNTER — HOSPITAL ENCOUNTER (OUTPATIENT)
Facility: CLINIC | Age: 76
End: 2021-02-01
Attending: ORTHOPAEDIC SURGERY | Admitting: ORTHOPAEDIC SURGERY
Payer: MEDICARE

## 2021-02-01 DIAGNOSIS — G56.01 CARPAL TUNNEL SYNDROME OF RIGHT WRIST: ICD-10-CM

## 2021-02-05 NOTE — TELEPHONE ENCOUNTER
Type of surgery: CTR   Location of surgery: [facility] OR  Date and time of surgery: 3/1  Surgeon: Prabhakar  Pre-Op Appt Date: NA  Post-Op Appt Date: 3/11   Packet sent out: Yes  Pre-cert/Authorization completed:  Not Applicable  Date: na

## 2021-02-08 ENCOUNTER — TELEPHONE (OUTPATIENT)
Dept: FAMILY MEDICINE | Facility: CLINIC | Age: 76
End: 2021-02-08

## 2021-02-08 DIAGNOSIS — G56.03 BILATERAL CARPAL TUNNEL SYNDROME: Primary | ICD-10-CM

## 2021-02-08 DIAGNOSIS — R20.2 NUMBNESS AND TINGLING IN BOTH HANDS: ICD-10-CM

## 2021-02-08 DIAGNOSIS — R20.0 NUMBNESS AND TINGLING IN BOTH HANDS: ICD-10-CM

## 2021-02-08 DIAGNOSIS — R29.898 WEAKNESS OF BOTH HANDS: ICD-10-CM

## 2021-02-08 NOTE — TELEPHONE ENCOUNTER
[phone]  All records for wrist for a 2nd opinion.  Please.  Thank you   Bárbara ALVARADO

## 2021-02-13 DIAGNOSIS — Z11.59 ENCOUNTER FOR SCREENING FOR OTHER VIRAL DISEASES: ICD-10-CM

## 2021-03-04 ENCOUNTER — OFFICE VISIT (OUTPATIENT)
Dept: FAMILY MEDICINE | Facility: CLINIC | Age: 76
End: 2021-03-04
Payer: COMMERCIAL

## 2021-03-04 VITALS
WEIGHT: 153.2 LBS | TEMPERATURE: 96.8 F | HEART RATE: 76 BPM | SYSTOLIC BLOOD PRESSURE: 124 MMHG | HEIGHT: 64 IN | DIASTOLIC BLOOD PRESSURE: 80 MMHG | RESPIRATION RATE: 18 BRPM | BODY MASS INDEX: 26.15 KG/M2 | OXYGEN SATURATION: 93 %

## 2021-03-04 DIAGNOSIS — G56.03 BILATERAL CARPAL TUNNEL SYNDROME: ICD-10-CM

## 2021-03-04 DIAGNOSIS — Z01.818 PRE-OP EXAM: Primary | ICD-10-CM

## 2021-03-04 LAB
ANION GAP SERPL CALCULATED.3IONS-SCNC: 2 MMOL/L (ref 3–14)
BASOPHILS # BLD AUTO: 0 10E9/L (ref 0–0.2)
BASOPHILS NFR BLD AUTO: 0.7 %
BUN SERPL-MCNC: 21 MG/DL (ref 7–30)
CALCIUM SERPL-MCNC: 9.3 MG/DL (ref 8.5–10.1)
CHLORIDE SERPL-SCNC: 109 MMOL/L (ref 94–109)
CO2 SERPL-SCNC: 29 MMOL/L (ref 20–32)
CREAT SERPL-MCNC: 0.9 MG/DL (ref 0.52–1.04)
DIFFERENTIAL METHOD BLD: NORMAL
EOSINOPHIL NFR BLD AUTO: 6.3 %
ERYTHROCYTE [DISTWIDTH] IN BLOOD BY AUTOMATED COUNT: 12.9 % (ref 10–15)
GFR SERPL CREATININE-BSD FRML MDRD: 62 ML/MIN/{1.73_M2}
GLUCOSE SERPL-MCNC: 89 MG/DL (ref 70–99)
HCT VFR BLD AUTO: 39.6 % (ref 35–47)
HGB BLD-MCNC: 12.7 G/DL (ref 11.7–15.7)
IMM GRANULOCYTES # BLD: 0 10E9/L (ref 0–0.4)
IMM GRANULOCYTES NFR BLD: 0.2 %
LYMPHOCYTES # BLD AUTO: 1.4 10E9/L (ref 0.8–5.3)
LYMPHOCYTES NFR BLD AUTO: 24 %
MCH RBC QN AUTO: 30.2 PG (ref 26.5–33)
MCHC RBC AUTO-ENTMCNC: 32.1 G/DL (ref 31.5–36.5)
MCV RBC AUTO: 94 FL (ref 78–100)
MONOCYTES # BLD AUTO: 0.5 10E9/L (ref 0–1.3)
MONOCYTES NFR BLD AUTO: 8.5 %
NEUTROPHILS # BLD AUTO: 3.6 10E9/L (ref 1.6–8.3)
NEUTROPHILS NFR BLD AUTO: 60.3 %
NRBC # BLD AUTO: 0 10*3/UL
NRBC BLD AUTO-RTO: 0 /100
PLATELET # BLD AUTO: 214 10E9/L (ref 150–450)
POTASSIUM SERPL-SCNC: 4.4 MMOL/L (ref 3.4–5.3)
RBC # BLD AUTO: 4.2 10E12/L (ref 3.8–5.2)
SODIUM SERPL-SCNC: 140 MMOL/L (ref 133–144)
WBC # BLD AUTO: 5.9 10E9/L (ref 4–11)

## 2021-03-04 PROCEDURE — 99214 OFFICE O/P EST MOD 30 MIN: CPT | Performed by: PHYSICIAN ASSISTANT

## 2021-03-04 PROCEDURE — 36415 COLL VENOUS BLD VENIPUNCTURE: CPT | Performed by: PHYSICIAN ASSISTANT

## 2021-03-04 PROCEDURE — 93000 ELECTROCARDIOGRAM COMPLETE: CPT | Performed by: PHYSICIAN ASSISTANT

## 2021-03-04 PROCEDURE — 85025 COMPLETE CBC W/AUTO DIFF WBC: CPT | Performed by: PHYSICIAN ASSISTANT

## 2021-03-04 PROCEDURE — 80048 BASIC METABOLIC PNL TOTAL CA: CPT | Performed by: PHYSICIAN ASSISTANT

## 2021-03-04 ASSESSMENT — MIFFLIN-ST. JEOR: SCORE: 1165.94

## 2021-03-04 ASSESSMENT — PAIN SCALES - GENERAL: PAINLEVEL: MILD PAIN (3)

## 2021-03-04 NOTE — LETTER
March 8, 2021      Charleen Childress  [address] [place] [id number]        Dear ,    We are writing to inform you of your test results.    The results of your lab work returned grossly within normal range. Good luck with your procedure.     Gavino Sauceda PA-C     Resulted Orders   Basic metabolic panel  (Ca, Cl, CO2, Creat, Gluc, K, Na, BUN)   Result Value Ref Range    Sodium 140 133 - 144 mmol/L    Potassium 4.4 3.4 - 5.3 mmol/L    Chloride 109 94 - 109 mmol/L    Carbon Dioxide 29 20 - 32 mmol/L    Anion Gap 2 (L) 3 - 14 mmol/L    Glucose 89 70 - 99 mg/dL    Urea Nitrogen 21 7 - 30 mg/dL    Creatinine 0.90 0.52 - 1.04 mg/dL    GFR Estimate 62 >60 mL/min/[1.73_m2]      Comment:      Non  GFR Calc  Starting 12/18/2018, serum creatinine based estimated GFR (eGFR) will be   calculated using the Chronic Kidney Disease Epidemiology Collaboration   (CKD-EPI) equation.      GFR Estimate If Black 72 >60 mL/min/[1.73_m2]      Comment:       GFR Calc  Starting 12/18/2018, serum creatinine based estimated GFR (eGFR) will be   calculated using the Chronic Kidney Disease Epidemiology Collaboration   (CKD-EPI) equation.      Calcium 9.3 8.5 - 10.1 mg/dL   CBC with platelets and differential   Result Value Ref Range    WBC 5.9 4.0 - 11.0 10e9/L    RBC Count 4.20 3.8 - 5.2 10e12/L    Hemoglobin 12.7 11.7 - 15.7 g/dL    Hematocrit 39.6 35.0 - 47.0 %    MCV 94 78 - 100 fl    MCH 30.2 26.5 - 33.0 pg    MCHC 32.1 31.5 - 36.5 g/dL    RDW 12.9 10.0 - 15.0 %    Platelet Count 214 150 - 450 10e9/L    Diff Method Automated Method     % Neutrophils 60.3 %    % Lymphocytes 24.0 %    % Monocytes 8.5 %    % Eosinophils 6.3 %    % Basophils 0.7 %    % Immature Granulocytes 0.2 %    Nucleated RBCs 0 0 /100    Absolute Neutrophil 3.6 1.6 - 8.3 10e9/L    Absolute Lymphocytes 1.4 0.8 - 5.3 10e9/L    Absolute Monocytes 0.5 0.0 - 1.3 10e9/L    Absolute Basophils 0.0 0.0 - 0.2 10e9/L    Abs Immature  Granulocytes 0.0 0 - 0.4 10e9/L    Absolute Nucleated RBC 0.0        If you have any questions or concerns, please call the clinic at the number listed above.

## 2021-03-04 NOTE — PROGRESS NOTES
[address] [address]  Phone: [phone]  Fax: [phone]  Primary Provider: Gavino Sauceda        PREOPERATIVE EVALUATION:  Today's date: 3/4/2021    Charleen Childress is a 75 year old female who presents for a preoperative evaluation.    Surgical Information:  Surgery/Procedure: right hand surgery  Surgery Location: [facility]  Surgeon: Dr. Real  Surgery Date: 3-8-2021  Time of Surgery: to be determined  Where patient plans to recover: At home with family  Fax number for surgical facility: [phone]    Type of Anesthesia Anticipated: General    Patient reports that she has had difficulty waking following sedation/anestheisa with past surgeries, 1982.     Assessment & Plan     The proposed surgical procedure is considered INTERMEDIATE risk.    Pre-op exam  Bilateral carpal tunnel syndrome  - Basic metabolic panel  (Ca, Cl, CO2, Creat, Gluc, K, Na, BUN)  - CBC with platelets and differential  - EKG 12-lead complete w/read - Clinics      Risks and Recommendations:  The patient has the following additional risks and recommendations for perioperative complications:   - No identified additional risk factors other than previously addressed    Medication Instructions:   - pregabalin, gabapentin: Continue without modification.   - naproxen (Aleve, Naprosyn): HOLD 4 days before surgery.     RECOMMENDATION:  APPROVAL GIVEN to proceed with proposed procedure, without further diagnostic evaluation.    Subjective     HPI related to upcoming procedure: right hand surgery    Preop Questions 3/4/2021   1. Have you ever had a heart attack or stroke? No   2. Have you ever had surgery on your heart or blood vessels, such as a stent placement, a coronary artery bypass, or surgery on an artery in your head, neck, heart, or legs? No   3. Do you have chest pain with activity? No   4. Do you have a history of  heart failure? No   5. Do you currently have  a cold, bronchitis or symptoms of other infection? No   6. Do you have a cough, shortness of breath, or wheezing? No   7. Do you or anyone in your family have previous history of blood clots? YES - self   8. Do you or does anyone in your family have a serious bleeding problem such as prolonged bleeding following surgeries or cuts? No   9. Have you ever had problems with anemia or been told to take iron pills? No   10. Have you had any abnormal blood loss such as black, tarry or bloody stools, or abnormal vaginal bleeding? No   11. Have you ever had a blood transfusion? No   12. Are you willing to have a blood transfusion if it is medically needed before, during, or after your surgery? Yes   13. Have you or any of your relatives ever had problems with anesthesia? YES - self   14. Do you have sleep apnea, excessive snoring or daytime drowsiness? No   15. Do you have any artifical heart valves or other implanted medical devices like a pacemaker, defibrillator, or continuous glucose monitor? No   16. Do you have artificial joints? No   17. Are you allergic to latex? No       Health Care Directive:  Patient has a Health Care Directive on file      Preoperative Review of :   reviewed - no record of controlled substances prescribed.      Status of Chronic Conditions:  See problem list for active medical problems.  Problems all longstanding and stable, except as noted/documented.  See ROS for pertinent symptoms related to these conditions.      Review of Systems  Constitutional, neuro, ENT, endocrine, pulmonary, cardiac, gastrointestinal, genitourinary, musculoskeletal, integument and psychiatric systems are negative, except as otherwise noted.    Patient Active Problem List    Diagnosis Date Noted     Carpal tunnel syndrome of right wrist 02/01/2021     Priority: Medium     Added automatically from request for surgery [id number]       CARDIOVASCULAR SCREENING; LDL GOAL LESS THAN 160 10/31/2010     Priority: Medium      "Viral warts 11/24/2006     Priority: Medium     Problem list name updated by automated process. Provider to review        History reviewed. No pertinent past medical history.  Past Surgical History:   Procedure Laterality Date     C APPENDECTOMY       C LIGATE FALLOPIAN TUBE,POSTPARTUM      Tubal Ligation     C VAGINAL HYSTERECTOMY  1981    Hysterectomy, Vaginal     Current Outpatient Medications   Medication Sig Dispense Refill     Chlorpheniramine Maleate (ALLERGY RELIEF PO) As needed       gabapentin (NEURONTIN) 300 MG capsule Take 1 cap (300mg) at bed x3days, then increase to 2 cap (600mg) at bedtime (Patient not taking: Reported on 1/21/2021) 57 capsule 0     naproxen sodium 220 MG capsule Take 220 mg by mouth 2 times daily (with meals)         Allergies   Allergen Reactions     No Known Drug Allergies         Social History     Tobacco Use     Smoking status: Never Smoker     Smokeless tobacco: Never Used   Substance Use Topics     Alcohol use: Yes     Comment: seldom       History   Drug Use No         Objective     /80 (BP Location: Right arm, Patient Position: Chair, Cuff Size: Adult Large)   Pulse 76   Temp 96.8  F (36  C) (Temporal)   Resp 18   Ht 1.619 m (5' 3.75\")   Wt 69.5 kg (153 lb 3.2 oz)   SpO2 93%   BMI 26.50 kg/m      Physical Exam    GENERAL APPEARANCE: healthy, alert and no distress     EYES: EOMI, PERRL     HENT: ear canals and TM's normal and nose and mouth without ulcers or lesions     NECK: no adenopathy, no asymmetry, masses, or scars and thyroid normal to palpation     RESP: lungs clear to auscultation - no rales, rhonchi or wheezes     CV: regular rates and rhythm, normal S1 S2, no S3 or S4 and no murmur, click or rub     ABDOMEN:  soft, nontender, no HSM or masses and bowel sounds normal     MS: extremities normal- no gross deformities noted, no evidence of inflammation in joints, FROM in all extremities.     NEURO: Normal strength and tone, sensory exam grossly normal, " mentation intact and speech normal     PSYCH: mentation appears normal. and affect normal/bright     LYMPHATICS: No cervical adenopathy    Recent Labs   Lab Test 11/03/20  0900   HGB 12.5         POTASSIUM 4.2   CR 0.86        Diagnostics:  Recent Results (from the past 24 hour(s))   Basic metabolic panel  (Ca, Cl, CO2, Creat, Gluc, K, Na, BUN)    Collection Time: 03/04/21 10:34 AM   Result Value Ref Range    Sodium 140 133 - 144 mmol/L    Potassium 4.4 3.4 - 5.3 mmol/L    Chloride 109 94 - 109 mmol/L    Carbon Dioxide 29 20 - 32 mmol/L    Anion Gap 2 (L) 3 - 14 mmol/L    Glucose 89 70 - 99 mg/dL    Urea Nitrogen 21 7 - 30 mg/dL    Creatinine 0.90 0.52 - 1.04 mg/dL    GFR Estimate 62 >60 mL/min/[1.73_m2]    GFR Estimate If Black 72 >60 mL/min/[1.73_m2]    Calcium 9.3 8.5 - 10.1 mg/dL   CBC with platelets and differential    Collection Time: 03/04/21 10:34 AM   Result Value Ref Range    WBC 5.9 4.0 - 11.0 10e9/L    RBC Count 4.20 3.8 - 5.2 10e12/L    Hemoglobin 12.7 11.7 - 15.7 g/dL    Hematocrit 39.6 35.0 - 47.0 %    MCV 94 78 - 100 fl    MCH 30.2 26.5 - 33.0 pg    MCHC 32.1 31.5 - 36.5 g/dL    RDW 12.9 10.0 - 15.0 %    Platelet Count 214 150 - 450 10e9/L    Diff Method Automated Method     % Neutrophils 60.3 %    % Lymphocytes 24.0 %    % Monocytes 8.5 %    % Eosinophils 6.3 %    % Basophils 0.7 %    % Immature Granulocytes 0.2 %    Nucleated RBCs 0 0 /100    Absolute Neutrophil 3.6 1.6 - 8.3 10e9/L    Absolute Lymphocytes 1.4 0.8 - 5.3 10e9/L    Absolute Monocytes 0.5 0.0 - 1.3 10e9/L    Absolute Basophils 0.0 0.0 - 0.2 10e9/L    Abs Immature Granulocytes 0.0 0 - 0.4 10e9/L    Absolute Nucleated RBC 0.0       EKG: sinus bradycardia, normal axis, normal intervals, first degree AV block with incomplete RBBB, no acute ST/T changes c/w ischemia, no LVH by voltage criteria,  No previous tracings    Revised Cardiac Risk Index (RCRI):  The patient has the following serious cardiovascular risks for  perioperative complications:   - No serious cardiac risks = 0 points     RCRI Interpretation: 0 points: Class I (very low risk - 0.4% complication rate)      Signed Electronically by: Gavino Sauceda PA-C  Copy of this evaluation report is provided to requesting physician.    [facility] Preop Guidelines    Revised Cardiac Risk Index

## 2021-03-04 NOTE — NURSING NOTE
Health Maintenance Due   Topic Date Due     DEXA  1945     COLORECTAL CANCER SCREENING  10/17/1954     COVID-19 Vaccine (1 of 2) 10/17/1960     HEPATITIS C SCREENING  10/17/1962     ZOSTER IMMUNIZATION (1 of 2) 10/17/1994     LIPID  03/28/2007     Pneumococcal Vaccine: 65+ Years (1 of 1 - PPSV23) 10/17/2009     MEDICARE ANNUAL WELLNESS VISIT  05/27/2011     INFLUENZA VACCINE (1) 09/01/2020     Tatianna WASHBURN LPN

## 2021-03-05 ENCOUNTER — TELEPHONE (OUTPATIENT)
Dept: FAMILY MEDICINE | Facility: CLINIC | Age: 76
End: 2021-03-05

## 2021-03-05 NOTE — TELEPHONE ENCOUNTER
Reason for Call:  Other pre op faxed    Detailed comments: Zaynab from [facility] needs pts pre-op, especially the history, physical labs and EKG fax to Attn: Zaynab [phone]  She needs it sent as soon as possible to get it sent over to the surgery center.  Phone Number Patient can be reached at: Other phone number:  n/a    Best Time: ASAP    Can we leave a detailed message on this number? N/a    Call taken on 3/5/2021 at 8:21 AM by Silvina Corea

## 2021-03-26 ENCOUNTER — OFFICE VISIT (OUTPATIENT)
Dept: FAMILY MEDICINE | Facility: CLINIC | Age: 76
End: 2021-03-26
Payer: COMMERCIAL

## 2021-03-26 VITALS
BODY MASS INDEX: 25.93 KG/M2 | RESPIRATION RATE: 14 BRPM | SYSTOLIC BLOOD PRESSURE: 128 MMHG | TEMPERATURE: 97.9 F | DIASTOLIC BLOOD PRESSURE: 74 MMHG | HEART RATE: 60 BPM | WEIGHT: 149.9 LBS | OXYGEN SATURATION: 98 %

## 2021-03-26 DIAGNOSIS — R39.9 UTI SYMPTOMS: Primary | ICD-10-CM

## 2021-03-26 DIAGNOSIS — N30.01 ACUTE CYSTITIS WITH HEMATURIA: ICD-10-CM

## 2021-03-26 LAB
ALBUMIN UR-MCNC: 100 MG/DL
APPEARANCE UR: ABNORMAL
BILIRUB UR QL STRIP: NEGATIVE
COLOR UR AUTO: YELLOW
GLUCOSE UR STRIP-MCNC: NEGATIVE MG/DL
HGB UR QL STRIP: ABNORMAL
KETONES UR STRIP-MCNC: NEGATIVE MG/DL
LEUKOCYTE ESTERASE UR QL STRIP: ABNORMAL
MUCOUS THREADS #/AREA URNS LPF: PRESENT /LPF
NITRATE UR QL: POSITIVE
PH UR STRIP: 6 PH (ref 5–7)
RBC #/AREA URNS AUTO: 29 /HPF (ref 0–2)
SOURCE: ABNORMAL
SP GR UR STRIP: 1.01 (ref 1–1.03)
TRANS CELLS #/AREA URNS HPF: 1 /HPF
UROBILINOGEN UR STRIP-MCNC: 0 MG/DL (ref 0–2)
WBC #/AREA URNS AUTO: >182 /HPF (ref 0–5)
WBC CLUMPS #/AREA URNS HPF: PRESENT /HPF

## 2021-03-26 PROCEDURE — 99213 OFFICE O/P EST LOW 20 MIN: CPT | Performed by: PHYSICIAN ASSISTANT

## 2021-03-26 PROCEDURE — 87086 URINE CULTURE/COLONY COUNT: CPT | Performed by: PHYSICIAN ASSISTANT

## 2021-03-26 PROCEDURE — 87186 SC STD MICRODIL/AGAR DIL: CPT | Performed by: PHYSICIAN ASSISTANT

## 2021-03-26 PROCEDURE — 81001 URINALYSIS AUTO W/SCOPE: CPT | Performed by: PHYSICIAN ASSISTANT

## 2021-03-26 PROCEDURE — 87088 URINE BACTERIA CULTURE: CPT | Performed by: PHYSICIAN ASSISTANT

## 2021-03-26 RX ORDER — CEPHALEXIN 500 MG/1
500 CAPSULE ORAL 2 TIMES DAILY
Qty: 14 CAPSULE | Refills: 0 | Status: SHIPPED | OUTPATIENT
Start: 2021-03-26 | End: 2021-04-02

## 2021-03-26 NOTE — PATIENT INSTRUCTIONS
Take full course of antibiotics- Keflex twice daily for 7 days.  Urine culture pending, we will call you only if culture shows resistance and change of antibiotic is required or if no growth to stop antibiotics and to follow-up with your primary care provider.  May use over the counter AZO pain relief or Uristat (phenazopyridine) for urinary burning but do not use for 24 hours before future urine tests.  Drink plenty of fluids. Limit caffeine and alcohol as these are bladder irritants.  May take tylenol or ibuprofen as needed for discomfort.   If you develop any vomiting, high fevers or lower back pain, these can be signs of a kidney infection and you should be seen in urgent care or in the ER.  Prevention of future infections by drinking cranberry juice, urination after intercourse and wiping from front to back after using the toilet.  Please follow up with primary care provider if symptoms return, if you're not improving, worsening or new symptoms or for any adverse reactions to medications.

## 2021-03-26 NOTE — PROGRESS NOTES
Assessment & Plan     UTI symptoms  - UA reflex to Microscopic ([facility]); Future  - Urine Culture Aerobic Bacterial; Future  - Urine Culture Aerobic Bacterial  - UA reflex to Microscopic ([facility])    Acute cystitis with hematuria  - cephALEXin (KEFLEX) 500 MG capsule; Take 1 capsule (500 mg) by mouth 2 times daily for 7 days    UA with moderate blood, positive nitrites, large leukocyte esterase, 100 protein.  We will treat with Keflex twice daily for 7 days.  Will adjust as needed based on culture result. Follow-up with PCP if symptoms worsen or fail to improve.    20 minutes spent on the date of the encounter doing chart review, patient visit and documentation     Return in about 3 days (around 3/29/2021) for Recheck with primary care provider if not improved.    Bekah Noel PA-C  [facility]    Roxanne Villaseñor is a 75 year old who presents for the following health issues     HPI     Genitourinary - Female  Onset/Duration: 4 1/2 days  Description:   Painful urination (Dysuria): YES           Frequency: YES  Blood in urine (Hematuria): no  Delay in urine (Hesitency): YES  Intensity: mild  Progression of Symptoms:  same  Accompanying Signs & Symptoms:  Fever/chills: no  Flank pain: YES  Nausea and vomiting: no  Vaginal symptoms: none  Abdominal/Pelvic Pain: YES  History:   History of frequent UTI s: no, had last one about 30 years ago            History of kidney stones: no  Precipitating or alleviating factors: None  Therapies tried and outcome:  none     Charleen presents the clinic today for evaluation of urinary frequency and burning for the last 4-1/2 days.  She states that she has had more nocturia than usual as well and has had some lower abdominal pressure.  She has had some lower back discomfort but she attributes this to sitting in her recliner more often since she recently had carpal tunnel surgery and she is not able to do all  the things she used to be able to do during this period of recovery. She has not noticed any blood in her urine, has not had fever chills, nausea vomiting or vaginal symptoms.      Review of Systems   ROS negative except as stated above.        Objective    /74 (BP Location: Right arm)   Pulse 60   Temp 97.9  F (36.6  C) (Temporal)   Resp 14   Wt 68 kg (149 lb 14.4 oz)   SpO2 98%   BMI 25.93 kg/m    Body mass index is 25.93 kg/m .  Physical Exam   GENERAL: healthy, alert and no distress  RESP: lungs clear to auscultation - no rales, rhonchi or wheezes  CV: regular rate and rhythm, normal S1 S2, no S3 or S4, no murmur  ABDOMEN: soft, nontender, no hepatosplenomegaly, no masses and bowel sounds normal  MS: no gross musculoskeletal defects noted, no edema  SKIN: no suspicious lesions or rashes  BACK: no CVA tenderness, no paralumbar tenderness    Results for orders placed or performed in visit on 03/26/21   UA reflex to Microscopic (Valarie Lopez; [facility])     Status: Abnormal   Result Value Ref Range    Color Urine Yellow     Appearance Urine Cloudy     Glucose Urine Negative NEG^Negative mg/dL    Bilirubin Urine Negative NEG^Negative    Ketones Urine Negative NEG^Negative mg/dL    Specific Gravity Urine 1.011 1.003 - 1.035    Blood Urine Moderate (A) NEG^Negative    pH Urine 6.0 5.0 - 7.0 pH    Protein Albumin Urine 100 (A) NEG^Negative mg/dL    Urobilinogen mg/dL 0.0 0.0 - 2.0 mg/dL    Nitrite Urine Positive (A) NEG^Negative    Leukocyte Esterase Urine Large (A) NEG^Negative    Source Midstream Urine     RBC Urine 29 (H) 0 - 2 /HPF    WBC Urine >182 (H) 0 - 5 /HPF    WBC Clumps Present (A) NEG^Negative /HPF    Transitional Epi 1 (A) FEW^Few /HPF    Mucous Urine Present (A) NEG^Negative /LPF

## 2021-03-27 LAB
BACTERIA SPEC CULT: ABNORMAL
Lab: ABNORMAL
SPECIMEN SOURCE: ABNORMAL

## 2021-03-29 NOTE — RESULT ENCOUNTER NOTE
Urine culture positive with >100,000 colonies/mL of E. coli. Bacteria is sensitive to cephalasporins and patient is currently taking Keflex. No change necessary.    Julisa Noel PA-C  [facility]

## 2021-04-11 ENCOUNTER — HOSPITAL ENCOUNTER (EMERGENCY)
Facility: CLINIC | Age: 76
Discharge: HOME | End: 2021-04-11
Attending: EMERGENCY MEDICINE | Admitting: EMERGENCY MEDICINE
Payer: MEDICARE

## 2021-04-11 VITALS
RESPIRATION RATE: 16 BRPM | SYSTOLIC BLOOD PRESSURE: 145 MMHG | OXYGEN SATURATION: 98 % | DIASTOLIC BLOOD PRESSURE: 85 MMHG | TEMPERATURE: 98.6 F | HEART RATE: 84 BPM

## 2021-04-11 DIAGNOSIS — M54.2 NECK PAIN: ICD-10-CM

## 2021-04-11 PROCEDURE — 99284 EMERGENCY DEPT VISIT MOD MDM: CPT | Performed by: EMERGENCY MEDICINE

## 2021-04-11 PROCEDURE — 99282 EMERGENCY DEPT VISIT SF MDM: CPT | Performed by: EMERGENCY MEDICINE

## 2021-04-11 PROCEDURE — 99283 EMERGENCY DEPT VISIT LOW MDM: CPT

## 2021-04-11 RX ORDER — TIZANIDINE 2 MG/1
2 TABLET ORAL 3 TIMES DAILY PRN
Qty: 20 TABLET | Refills: 0 | Status: SHIPPED | OUTPATIENT
Start: 2021-04-11 | End: 2021-04-21

## 2021-04-11 RX ORDER — HYDROCODONE BITARTRATE AND ACETAMINOPHEN 5; 325 MG/1; MG/1
1 TABLET ORAL EVERY 4 HOURS PRN
Qty: 3 TABLET | Refills: 0 | Status: SHIPPED | OUTPATIENT
Start: 2021-04-11 | End: 2023-05-22

## 2021-04-11 RX ORDER — TIZANIDINE 2 MG/1
2 TABLET ORAL 3 TIMES DAILY PRN
Status: DISCONTINUED | OUTPATIENT
Start: 2021-04-11 | End: 2021-04-11 | Stop reason: HOSPADM

## 2021-04-11 RX ORDER — TIZANIDINE 2 MG/1
2 TABLET ORAL 3 TIMES DAILY PRN
Qty: 3 TABLET | Refills: 0 | Status: SHIPPED | OUTPATIENT
Start: 2021-04-11 | End: 2021-04-12

## 2021-04-11 RX ORDER — HYDROCODONE BITARTRATE AND ACETAMINOPHEN 5; 325 MG/1; MG/1
1 TABLET ORAL EVERY 4 HOURS PRN
Status: DISCONTINUED | OUTPATIENT
Start: 2021-04-11 | End: 2021-04-11 | Stop reason: HOSPADM

## 2021-04-11 RX ORDER — HYDROCODONE BITARTRATE AND ACETAMINOPHEN 5; 325 MG/1; MG/1
1 TABLET ORAL EVERY 4 HOURS PRN
Qty: 15 TABLET | Refills: 0 | Status: SHIPPED | OUTPATIENT
Start: 2021-04-11 | End: 2023-05-22

## 2021-04-11 NOTE — DISCHARGE INSTRUCTIONS
As discussed your neck pain is most likely muscular.  I sent a prescription for a muscle relaxer and [place] to our pharmacy and we will send you home with a few of those pills.  Use caution when getting up from a seated position as these can make you dizzy or loopy.  Return to the emergency department if new or worsening symptoms.  We may consider doing a trigger point injection as I discussed today.  I hope you get better quickly.

## 2021-04-11 NOTE — ED TRIAGE NOTES
Started with right ear pain that radiated into neck.  Now having pain to both sides of neck.  Denies injury.

## 2021-04-11 NOTE — ED PROVIDER NOTES
History     Chief Complaint   Patient presents with     Neck Pain     HPI  Charleen Childress is a 75 year old female who presents to the emergency department secondary to neck pain that started 1 week ago while waking.  It started to hurt behind the right ear and then seem to involve the bilateral paracervical spine muscles.  Her range of motion has decreased over time and the worst of the pain is in the muscles at the base of the skull near the upper paracervical spine area.  No persistent ear pain, fever, cough, shortness of breath or other new symptoms.    Allergies:  Allergies   Allergen Reactions     No Known Drug Allergies        Problem List:    Patient Active Problem List    Diagnosis Date Noted     Carpal tunnel syndrome of right wrist 02/01/2021     Priority: Medium     Added automatically from request for surgery [id number]       CARDIOVASCULAR SCREENING; LDL GOAL LESS THAN 160 10/31/2010     Priority: Medium     Viral warts 11/24/2006     Priority: Medium     Problem list name updated by automated process. Provider to review          Past Medical History:    No past medical history on file.    Past Surgical History:    Past Surgical History:   Procedure Laterality Date     C APPENDECTOMY       C LIGATE FALLOPIAN TUBE,POSTPARTUM      Tubal Ligation     C VAGINAL HYSTERECTOMY  1981    Hysterectomy, Vaginal       Family History:    Family History   Problem Relation Age of Onset     Cerebrovascular Disease Mother      Diabetes Mother      Cancer Father        Social History:  Marital Status:   [2]  Social History     Tobacco Use     Smoking status: Never Smoker     Smokeless tobacco: Never Used   Substance Use Topics     Alcohol use: Yes     Comment: seldom     Drug use: No        Medications:    HYDROcodone-acetaminophen (NORCO) 5-325 MG tablet  HYDROcodone-acetaminophen (NORCO) 5-325 MG tablet  tiZANidine (ZANAFLEX) 2 MG tablet  tiZANidine (ZANAFLEX) 2 MG tablet  Chlorpheniramine Maleate (ALLERGY  RELIEF PO)  gabapentin (NEURONTIN) 300 MG capsule  naproxen sodium 220 MG capsule          Review of Systems   All other systems reviewed and are negative.      Physical Exam   BP: (!) 145/85  Pulse: 84  Temp: 98.6  F (37  C)  Resp: 16  SpO2: 98 %      Physical Exam  Vitals signs and nursing note reviewed.   Constitutional:       General: She is not in acute distress.     Appearance: Normal appearance. She is well-developed. She is not diaphoretic.   HENT:      Head: Normocephalic and atraumatic.      Right Ear: External ear normal.      Left Ear: External ear normal.      Nose: Nose normal. No congestion or rhinorrhea.      Mouth/Throat:      Mouth: Mucous membranes are moist.   Eyes:      General: No scleral icterus.     Extraocular Movements: Extraocular movements intact.      Pupils: Pupils are equal, round, and reactive to light.   Neck:      Comments: Tenderness palpation of bilateral trapezius and paraspinal muscles in the cervical area with limited range of motion  Cardiovascular:      Rate and Rhythm: Normal rate.   Pulmonary:      Effort: Pulmonary effort is normal.   Skin:     General: Skin is warm and dry.      Coloration: Skin is not pale.      Findings: No erythema or rash.   Neurological:      General: No focal deficit present.      Mental Status: She is alert and oriented to person, place, and time.      Cranial Nerves: No cranial nerve deficit.      Sensory: No sensory deficit.      Motor: No weakness.      Coordination: Coordination normal.   Psychiatric:         Mood and Affect: Mood normal.         ED Course        Procedures                   No results found for this or any previous visit (from the past 24 hour(s)).    Medications - No data to display    Assessments & Plan (with Medical Decision Making)  Neck muscle pain.  Extremely limited range of motion.  I think she would benefit from a paracervical spine injection or occipital nerve block bilaterally but the patient declined.  She would  like to try pills first.  I warned her extensively about the use of pain pills and muscle relaxers as they may cause dizziness or lightheadedness.  She wanted to proceed and would use caution.  She lives with her  who can help her.  Return to ER precautions or follow-up precautions discussed.     I have reviewed the nursing notes.    I have reviewed the findings, diagnosis, plan and need for follow up with the patient.      New Prescriptions    HYDROCODONE-ACETAMINOPHEN (NORCO) 5-325 MG TABLET    Take 1 tablet by mouth every 4 hours as needed    HYDROCODONE-ACETAMINOPHEN (NORCO) 5-325 MG TABLET    Take 1 tablet by mouth every 4 hours as needed for pain    TIZANIDINE (ZANAFLEX) 2 MG TABLET    Take 1 tablet (2 mg) by mouth 3 times daily as needed for muscle spasms    TIZANIDINE (ZANAFLEX) 2 MG TABLET    Take 1 tablet (2 mg) by mouth 3 times daily as needed for muscle spasms       Final diagnoses:   Neck pain       4/11/2021   [facility] EMERGENCY DEPT     Guido Fitch MD  04/11/21 [id number]

## 2021-04-12 ENCOUNTER — TRANSFERRED RECORDS (OUTPATIENT)
Dept: HEALTH INFORMATION MANAGEMENT | Facility: CLINIC | Age: 76
End: 2021-04-12

## 2021-04-20 DIAGNOSIS — M25.50 MULTIPLE JOINT PAIN: Primary | ICD-10-CM

## 2021-04-20 NOTE — TELEPHONE ENCOUNTER
Requested Prescriptions   Pending Prescriptions Disp Refills     tiZANidine (ZANAFLEX) 2 MG tablet 20 tablet 0     Sig: Take 1 tablet (2 mg) by mouth 3 times daily as needed for muscle spasms        Last Written Prescription Date:  04/11/2021  Last Fill Quantity: 20,   # refills: 0  Last Office Visit: 03/04/2021  Future Office visit:       Routing refill request to provider for review/approval because:  Drug not on the [facility], [facility] or [facility] refill protocol or controlled substance    Portia Randolph MA

## 2021-04-21 RX ORDER — TIZANIDINE 2 MG/1
2 TABLET ORAL 3 TIMES DAILY PRN
Qty: 20 TABLET | Refills: 0 | Status: SHIPPED | OUTPATIENT
Start: 2021-04-21 | End: 2023-05-22

## 2021-06-11 ENCOUNTER — TELEPHONE (OUTPATIENT)
Dept: FAMILY MEDICINE | Facility: CLINIC | Age: 76
End: 2021-06-11

## 2021-06-11 NOTE — TELEPHONE ENCOUNTER
RN TRIAGE CALL:    Patient Contact    Attempt # 1    Was call answered?  No.  Unable to leave message. - line was busy X2.    Jackie Strauss RN
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
## 2021-06-14 NOTE — TELEPHONE ENCOUNTER
RN TRIAGE CALL:    Patient Contact    Attempt # 2    Was call answered?  No.  Left message on voicemail with information to call triage RN back.    Jackie Strauss RN

## 2021-07-15 ENCOUNTER — TELEPHONE (OUTPATIENT)
Dept: FAMILY MEDICINE | Facility: CLINIC | Age: 76
End: 2021-07-15
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
## 2021-07-16 ENCOUNTER — OFFICE VISIT (OUTPATIENT)
Dept: FAMILY MEDICINE | Facility: CLINIC | Age: 76
End: 2021-07-16
Payer: COMMERCIAL

## 2021-07-16 ENCOUNTER — HOSPITAL ENCOUNTER (EMERGENCY)
Facility: CLINIC | Age: 76
Discharge: HOME | End: 2021-07-16
Attending: EMERGENCY MEDICINE | Admitting: EMERGENCY MEDICINE
Payer: MEDICARE

## 2021-07-16 ENCOUNTER — APPOINTMENT (OUTPATIENT)
Dept: GENERAL RADIOLOGY | Facility: CLINIC | Age: 76
End: 2021-07-16
Attending: EMERGENCY MEDICINE
Payer: MEDICARE

## 2021-07-16 VITALS
RESPIRATION RATE: 18 BRPM | BODY MASS INDEX: 22.8 KG/M2 | HEART RATE: 63 BPM | DIASTOLIC BLOOD PRESSURE: 74 MMHG | WEIGHT: 131.8 LBS | TEMPERATURE: 97.9 F | SYSTOLIC BLOOD PRESSURE: 117 MMHG | OXYGEN SATURATION: 96 %

## 2021-07-16 VITALS
DIASTOLIC BLOOD PRESSURE: 64 MMHG | HEART RATE: 68 BPM | OXYGEN SATURATION: 99 % | SYSTOLIC BLOOD PRESSURE: 96 MMHG | TEMPERATURE: 98 F

## 2021-07-16 DIAGNOSIS — I49.9 IRREGULAR HEART BEAT: ICD-10-CM

## 2021-07-16 DIAGNOSIS — U07.1 INFECTION DUE TO 2019 NOVEL CORONAVIRUS: ICD-10-CM

## 2021-07-16 DIAGNOSIS — R53.83 FATIGUE, UNSPECIFIED TYPE: Primary | ICD-10-CM

## 2021-07-16 LAB
ALBUMIN SERPL-MCNC: 3.6 G/DL (ref 3.4–5)
ALP SERPL-CCNC: 80 U/L (ref 40–150)
ALT SERPL W P-5'-P-CCNC: 38 U/L (ref 0–50)
ANION GAP SERPL CALCULATED.3IONS-SCNC: 7 MMOL/L (ref 3–14)
AST SERPL W P-5'-P-CCNC: 67 U/L (ref 0–45)
BASOPHILS # BLD MANUAL: 0 10E3/UL (ref 0–0.2)
BASOPHILS NFR BLD MANUAL: 0 %
BILIRUB SERPL-MCNC: 0.4 MG/DL (ref 0.2–1.3)
BUN SERPL-MCNC: 25 MG/DL (ref 7–30)
CALCIUM SERPL-MCNC: 8.9 MG/DL (ref 8.5–10.1)
CHLORIDE BLD-SCNC: 105 MMOL/L (ref 94–109)
CO2 SERPL-SCNC: 27 MMOL/L (ref 20–32)
CREAT SERPL-MCNC: 1.36 MG/DL (ref 0.52–1.04)
EOSINOPHIL # BLD MANUAL: 0 10E3/UL (ref 0–0.7)
EOSINOPHIL NFR BLD MANUAL: 1 %
ERYTHROCYTE [DISTWIDTH] IN BLOOD BY AUTOMATED COUNT: 15 % (ref 10–15)
GFR SERPL CREATININE-BSD FRML MDRD: 38 ML/MIN/1.73M2
GLUCOSE BLD-MCNC: 97 MG/DL (ref 70–99)
HCT VFR BLD AUTO: 39.9 % (ref 35–47)
HGB BLD-MCNC: 12.7 G/DL (ref 11.7–15.7)
LYMPHOCYTES # BLD MANUAL: 1.1 10E3/UL (ref 0.8–5.3)
LYMPHOCYTES NFR BLD MANUAL: 24 %
MCH RBC QN AUTO: 29.2 PG (ref 26.5–33)
MCHC RBC AUTO-ENTMCNC: 31.8 G/DL (ref 31.5–36.5)
MCV RBC AUTO: 92 FL (ref 78–100)
MONOCYTES # BLD MANUAL: 0.2 10E3/UL (ref 0–1.3)
MONOCYTES NFR BLD MANUAL: 4 %
NEUTROPHILS # BLD MANUAL: 3.2 10E3/UL (ref 1.6–8.3)
NEUTROPHILS NFR BLD MANUAL: 71 %
PLAT MORPH BLD: NORMAL
PLATELET # BLD AUTO: 98 10E3/UL (ref 150–450)
POTASSIUM BLD-SCNC: 4.1 MMOL/L (ref 3.4–5.3)
PROT SERPL-MCNC: 7.5 G/DL (ref 6.8–8.8)
RBC # BLD AUTO: 4.35 10E6/UL (ref 3.8–5.2)
RBC MORPH BLD: NORMAL
SARS-COV-2 RNA RESP QL NAA+PROBE: POSITIVE
SODIUM SERPL-SCNC: 139 MMOL/L (ref 133–144)
WBC # BLD AUTO: 4.5 10E3/UL (ref 4–11)

## 2021-07-16 PROCEDURE — 36592 COLLECT BLOOD FROM PICC: CPT | Performed by: EMERGENCY MEDICINE

## 2021-07-16 PROCEDURE — 36415 COLL VENOUS BLD VENIPUNCTURE: CPT | Performed by: EMERGENCY MEDICINE

## 2021-07-16 PROCEDURE — C9803 HOPD COVID-19 SPEC COLLECT: HCPCS | Performed by: EMERGENCY MEDICINE

## 2021-07-16 PROCEDURE — 99284 EMERGENCY DEPT VISIT MOD MDM: CPT | Mod: 25 | Performed by: EMERGENCY MEDICINE

## 2021-07-16 PROCEDURE — 99214 OFFICE O/P EST MOD 30 MIN: CPT | Performed by: PHYSICIAN ASSISTANT

## 2021-07-16 PROCEDURE — 96360 HYDRATION IV INFUSION INIT: CPT | Performed by: EMERGENCY MEDICINE

## 2021-07-16 PROCEDURE — 99284 EMERGENCY DEPT VISIT MOD MDM: CPT | Performed by: EMERGENCY MEDICINE

## 2021-07-16 PROCEDURE — 82040 ASSAY OF SERUM ALBUMIN: CPT | Performed by: EMERGENCY MEDICINE

## 2021-07-16 PROCEDURE — 85014 HEMATOCRIT: CPT | Performed by: EMERGENCY MEDICINE

## 2021-07-16 PROCEDURE — 258N000003 HC RX IP 258 OP 636: Performed by: EMERGENCY MEDICINE

## 2021-07-16 PROCEDURE — 87635 SARS-COV-2 COVID-19 AMP PRB: CPT | Performed by: EMERGENCY MEDICINE

## 2021-07-16 PROCEDURE — 71045 X-RAY EXAM CHEST 1 VIEW: CPT

## 2021-07-16 RX ORDER — SODIUM CHLORIDE 9 MG/ML
INJECTION, SOLUTION INTRAVENOUS CONTINUOUS
Status: DISCONTINUED | OUTPATIENT
Start: 2021-07-16 | End: 2021-07-16 | Stop reason: HOSPADM

## 2021-07-16 RX ADMIN — SODIUM CHLORIDE 1000 ML: 9 INJECTION, SOLUTION INTRAVENOUS at 10:48

## 2021-07-16 ASSESSMENT — ENCOUNTER SYMPTOMS
FATIGUE: 1
APPETITE CHANGE: 1
ACTIVITY CHANGE: 1
FEVER: 1
MYALGIAS: 1
COUGH: 1
WEAKNESS: 1
CHILLS: 1

## 2021-07-16 NOTE — ED PROVIDER NOTES
History     Chief Complaint   Patient presents with     Fatigue     Sinusitis     HPI     Charleen Childress is a 75 year old female who presents with complaint of fatigue, dry cough.  Symptoms started with some diarrhea which is resolved.  She been very tired with poor appetite and has had a dry cough that.  Symptoms progressive over the last 4 days.   sick with similar symptoms.  Neither of them have received the Covid vaccine.  No chest discomfort, palpitations or dyspnea.    Allergies:  Allergies   Allergen Reactions     No Known Drug Allergies        Problem List:    Patient Active Problem List    Diagnosis Date Noted     Carpal tunnel syndrome of right wrist 02/01/2021     Priority: Medium     Added automatically from request for surgery [id number]       CARDIOVASCULAR SCREENING; LDL GOAL LESS THAN 160 10/31/2010     Priority: Medium     Viral warts 11/24/2006     Priority: Medium     Problem list name updated by automated process. Provider to review          Past Medical History:    History reviewed. No pertinent past medical history.    Past Surgical History:    Past Surgical History:   Procedure Laterality Date     C APPENDECTOMY       C LIGATE FALLOPIAN TUBE,POSTPARTUM      Tubal Ligation     C VAGINAL HYSTERECTOMY  1981    Hysterectomy, Vaginal       Family History:    Family History   Problem Relation Age of Onset     Cerebrovascular Disease Mother      Diabetes Mother      Cancer Father        Social History:  Marital Status:   [2]  Social History     Tobacco Use     Smoking status: Never Smoker     Smokeless tobacco: Never Used   Substance Use Topics     Alcohol use: Yes     Comment: seldom     Drug use: No        Medications:    benzonatate (TESSALON) 100 MG capsule  Chlorpheniramine Maleate (ALLERGY RELIEF PO)  fluticasone (FLONASE) 50 MCG/ACT nasal spray  gabapentin (NEURONTIN) 300 MG capsule  HYDROcodone-acetaminophen (NORCO) 5-325 MG tablet  HYDROcodone-acetaminophen (NORCO) 5-325 MG  tablet  naproxen sodium 220 MG capsule  tiZANidine (ZANAFLEX) 2 MG tablet          Review of Systems   Constitutional: Positive for activity change, appetite change, chills, fatigue and fever.   Respiratory: Positive for cough.    Musculoskeletal: Positive for myalgias.   Neurological: Positive for weakness.   All other systems reviewed and are negative.      Physical Exam   BP: 117/74  Pulse: 63  Temp: 97.9  F (36.6  C)  Resp: 18  Weight: 59.8 kg (131 lb 12.8 oz)  SpO2: 96 %      Physical Exam  Vitals and nursing note reviewed.   Constitutional:       Appearance: She is not ill-appearing.   HENT:      Head: Normocephalic and atraumatic.      Right Ear: External ear normal.      Left Ear: External ear normal.      Nose: Nose normal.   Eyes:      General: Lids are normal. No scleral icterus.     Conjunctiva/sclera: Conjunctivae normal.      Pupils: Pupils are equal, round, and reactive to light.      Funduscopic exam:     Right eye: No hemorrhage.         Left eye: No hemorrhage.   Neck:      Vascular: No carotid bruit or JVD.      Trachea: Trachea normal.   Cardiovascular:      Rate and Rhythm: Normal rate and regular rhythm.  No extrasystoles are present.     Heart sounds: S1 normal and S2 normal. No murmur heard.     Pulmonary:      Effort: Pulmonary effort is normal. No respiratory distress.      Breath sounds: Normal breath sounds.   Abdominal:      General: Bowel sounds are normal. There is no distension.      Palpations: Abdomen is soft. There is no hepatomegaly or splenomegaly.      Tenderness: There is no abdominal tenderness. There is no rebound.      Hernia: No hernia is present.   Musculoskeletal:         General: Normal range of motion.      Cervical back: Neck supple.   Lymphadenopathy:      Cervical: No cervical adenopathy.   Skin:     General: Skin is warm and dry.      Coloration: Skin is not pale.      Findings: No rash.   Neurological:      Mental Status: She is alert and oriented to person, place,  and time.      Sensory: No sensory deficit.      Deep Tendon Reflexes: Reflexes are normal and symmetric.   Psychiatric:         Speech: Speech normal.         Behavior: Behavior normal.         ED Course        Procedures                  Results for orders placed or performed during the hospital encounter of 07/16/21 (from the past 24 hour(s))   CBC with platelets differential    Narrative    The following orders were created for panel order CBC with platelets differential.  Procedure                               Abnormality         Status                     ---------                               -----------         ------                     CBC with platelets and d...[[id number]]  Abnormal            Final result               Manual Differential[[id number]]                              Final result                 Please view results for these tests on the individual orders.   Comprehensive metabolic panel   Result Value Ref Range    Sodium 139 133 - 144 mmol/L    Potassium 4.1 3.4 - 5.3 mmol/L    Chloride 105 94 - 109 mmol/L    Carbon Dioxide (CO2) 27 20 - 32 mmol/L    Anion Gap 7 3 - 14 mmol/L    Urea Nitrogen 25 7 - 30 mg/dL    Creatinine 1.36 (H) 0.52 - 1.04 mg/dL    Calcium 8.9 8.5 - 10.1 mg/dL    Glucose 97 70 - 99 mg/dL    Alkaline Phosphatase 80 40 - 150 U/L    AST 67 (H) 0 - 45 U/L    ALT 38 0 - 50 U/L    Protein Total 7.5 6.8 - 8.8 g/dL    Albumin 3.6 3.4 - 5.0 g/dL    Bilirubin Total 0.4 0.2 - 1.3 mg/dL    GFR Estimate 38 (L) >60 mL/min/1.73m2   CBC with platelets and differential   Result Value Ref Range    WBC Count 4.5 4.0 - 11.0 10e3/uL    RBC Count 4.35 3.80 - 5.20 10e6/uL    Hemoglobin 12.7 11.7 - 15.7 g/dL    Hematocrit 39.9 35.0 - 47.0 %    MCV 92 78 - 100 fL    MCH 29.2 26.5 - 33.0 pg    MCHC 31.8 31.5 - 36.5 g/dL    RDW 15.0 10.0 - 15.0 %    Platelet Count 98 (L) 150 - 450 10e3/uL   Manual Differential   Result Value Ref Range    % Neutrophils 71 %    % Lymphocytes 24 %    % Monocytes 4 %     % Eosinophils 1 %    % Basophils 0 %    Absolute Neutrophils 3.2 1.6 - 8.3 10e3/uL    Absolute Lymphocytes 1.1 0.8 - 5.3 10e3/uL    Absolute Monocytes 0.2 0.0 - 1.3 10e3/uL    Absolute Eosinophils 0.0 0.0 - 0.7 10e3/uL    Absolute Basophils 0.0 0.0 - 0.2 10e3/uL    RBC Morphology Confirmed RBC Indices     Platelet Assessment  Automated Count Confirmed. Platelet morphology is normal.     Automated Count Confirmed. Platelet morphology is normal.   Symptomatic COVID-19 Virus (Coronavirus) by PCR Nasopharyngeal    Specimen: Nasopharyngeal; Swab    Narrative    The following orders were created for panel order Symptomatic COVID-19 Virus (Coronavirus) by PCR Nasopharyngeal.  Procedure                               Abnormality         Status                     ---------                               -----------         ------                     SARS-COV2 (COVID-19) Vir...[[id number]]  Abnormal            Final result                 Please view results for these tests on the individual orders.   SARS-COV2 (COVID-19) Virus RT-PCR    Specimen: Nasopharyngeal; Swab   Result Value Ref Range    SARS CoV2 PCR Positive (A) Negative    Narrative    Testing was performed using the kathy  SARS-CoV-2 & Influenza A/B Assay on the kathy  [place]  System.  This test should be ordered for the detection of SARS-COV-2 in individuals who meet SARS-CoV-2 clinical and/or epidemiological criteria. Test performance is unknown in asymptomatic patients.  This test is for in vitro diagnostic use under the FDA EUA for laboratories certified under CLIA to perform moderate and/or high complexity testing. This test has not been FDA cleared or approved.  A negative test does not rule out the presence of PCR inhibitors in the specimen or target RNA in concentration below the limit of detection for the assay. The possibility of a false negative should be considered if the patient's recent exposure or clinical presentation suggests COVID-19.  [facility]  [facility] are certified under the Clinical Laboratory Improvement Amendments of 1988 (CLIA-88) as qualified to perform moderate and/or high complexity laboratory testing.   XR Chest Port 1 View    Narrative    XR CHEST PORT 1 VIEW 7/16/2021 11:06 AM    HISTORY: Worsening sinus congestion and fatigue, cough    COMPARISON: X-ray 1/23/2019      Impression    IMPRESSION: Multiple old healed upper left rib fractures. No acute  findings. The lungs are clear and there are no pleural effusions.  Upper normal heart size. No overt vascular congestion.    MEREDITH BERRIOS MD         SYSTEM ID:  [id number]       Medications   0.9% sodium chloride BOLUS (1,000 mLs Intravenous New Bag 7/16/21 1048)     Followed by   sodium chloride 0.9% infusion (has no administration in time range)       Assessments & Plan (with Medical Decision Making)  75-year-old female.  Covid positive.  Day 5 of illness.  No hypoxia or tachypnea.  Plan at this time is to discharge home.  She is going to purchase a pulse oximeter for home monitoring.  Rest, good nutrition and monitoring of oxygen levels are important.  She needs to go into social isolation for the next 10 days.     I have reviewed the nursing notes.    I have reviewed the findings, diagnosis, plan and need for follow up with the patient.      New Prescriptions    No medications on file       Final diagnoses:   Infection due to 2019 novel coronavirus       7/16/2021   [facility] EMERGENCY DEPT     Guido Avalos, DO  07/16/21 2694

## 2021-07-16 NOTE — PROGRESS NOTES
Assessment & Plan     Fatigue, unspecified type    Irregular heart beat    Heartbeat irregularly irregular.  Blood pressure is a little low for Charleen's baseline.  EKG 4 months ago was normal.   needs to be seen in the ER due to low oxygen saturation.  Advised that Charleen be seen in the ER as well.    30 minutes spent on the date of the encounter doing chart review, patient visit and documentation       No follow-ups on file.    RACH Palumbo [facility]   Charleen is a 75 year old who presents for the following health issues  accompanied by her spouse:    HPI     Acute Illness  Acute illness concerns: cough and feeling tired  Onset/Duration: 1 - 1/2 months  Symptoms:  Fever: no  Chills/Sweats: no  Headache (location?): no  Sinus Pressure: yes, using flonase which helps   Conjunctivitis:  no  Ear Pain: no, popping  Rhinorrhea: YES  Congestion: YES  Sore Throat: no  Cough: YES-non-productive, productive of clear sputum, with shortness of breath, improving over time  Wheeze: no  Decreased Appetite: YES  Nausea: no  Vomiting: no  Diarrhea: no  Dysuria/Freq.: no  Dysuria or Hematuria: no  Fatigue/Achiness: YES- both  Sick/Strep Exposure: no  Therapies tried and outcome: flonase, hot wash cloth on face  Charleen presents to the clinic today with her  for evaluation of fatigue.  She notes that she has had fatigue ever since having heatstroke approximately 6 weeks ago.  She did have a cough and diarrhea about a week and a half ago but this is clearly resolved.  She does feel like she has a little nasal congestion she has been taking cetirizine and Flonase and this has been helping.  Her fatigue feels like it is getting quite a bit worse.  She goes to sleep and wakes up feeling refreshed in the morning but a couple hours later is wiped out again.  She has no known cardiac history.  An EKG done in March 2021 was normal.  Her blood pressure is generally in the 120s  over 80s or may be a little higher.    Review of Systems   ROS negative except as stated above.        Objective    BP 96/64   Pulse 68   Temp 98  F (36.7  C) (Temporal)   SpO2 99%   There is no height or weight on file to calculate BMI.  Physical Exam   GENERAL: healthy, alert and no distress  EYES: Eyes grossly normal to inspection, PERRL and conjunctivae and sclerae normal  HENT: ear canals and TM's normal, nose and mouth without ulcers or lesions  NECK: no adenopathy, no asymmetry, masses, or scars and thyroid normal to palpation  RESP: lungs clear to auscultation - no rales, rhonchi or wheezes  CV: Heartbeat is irregularly irregular.    No results found for any visits on 07/16/21.

## 2021-07-16 NOTE — DISCHARGE INSTRUCTIONS
Medical evaluation confirmed that both you and your  are positive for the COVID-19 virus.  At this time your chest x-ray is entirely clear with no lung congestion from the virus.  Your oxygen levels have been doing well at 96%.  You can return home but it will be important that you have a means to monitor your oxygen level.  I would encourage you to purchase a pulse oximeter at a local pharmacy.  This is a device that is placed on your finger and will record your oxygen levels.  If your oxygen levels are noted to be below 90% it will be important that she return to the emergency department.  Rest is encouraged.  Maintain good hydration and nutrition.  Must go into quarantine/isolation for the next 10 days.

## 2021-08-27 ENCOUNTER — TRANSFERRED RECORDS (OUTPATIENT)
Dept: HEALTH INFORMATION MANAGEMENT | Facility: CLINIC | Age: 76
End: 2021-08-27

## 2021-10-21 ENCOUNTER — VIRTUAL VISIT (OUTPATIENT)
Dept: FAMILY MEDICINE | Facility: CLINIC | Age: 76
End: 2021-10-21
Payer: COMMERCIAL

## 2021-10-21 ENCOUNTER — TELEPHONE (OUTPATIENT)
Dept: FAMILY MEDICINE | Facility: CLINIC | Age: 76
End: 2021-10-21

## 2021-10-21 DIAGNOSIS — Z11.52 ENCOUNTER FOR SCREENING FOR COVID-19: Primary | ICD-10-CM

## 2021-10-21 PROCEDURE — 99212 OFFICE O/P EST SF 10 MIN: CPT | Mod: 95 | Performed by: PHYSICIAN ASSISTANT

## 2021-10-21 NOTE — TELEPHONE ENCOUNTER
Patient has a concert coming up on Nov 2nd 2021. She needs a covid test 3 days prior the event. Please call her and help schedule a covid test for Oct 29th (if Saturday morning test on Oct 30th is available then we can do that).     Thanks so much.     Gavino Sauceda PA-C on 10/21/2021 at 1:12 PM

## 2021-10-21 NOTE — NURSING NOTE
Health Maintenance Due   Topic Date Due     DEXA  Never done     ANNUAL REVIEW OF HM ORDERS  Never done     COVID-19 Vaccine (1) Never done     HEPATITIS C SCREENING  Never done     ZOSTER IMMUNIZATION (1 of 2) Never done     LIPID  03/28/2007     Pneumococcal Vaccine: 65+ Years (1 of 1 - PPSV23) Never done     MEDICARE ANNUAL WELLNESS VISIT  05/27/2011     INFLUENZA VACCINE (1) Never done     FALL RISK ASSESSMENT  11/03/2021     Tatianna WASHBURN LPN

## 2021-10-21 NOTE — TELEPHONE ENCOUNTER
Spoke with patient and informed that orders were placed and a team member from [facility] will be calling them to schedule.   Portia Randolph MA

## 2021-10-21 NOTE — PROGRESS NOTES
"Charleen is a 76 year old who is being evaluated via a billable telephone visit.      What phone number would you like to be contacted at? [phone]  How would you like to obtain your AVS? Mail a copy    Assessment & Plan     Encounter for screening for COVID-19  See HPI. Test to be scheduled either Oct 29th or 30th   - Asymptomatic COVID-19 Virus (Coronavirus) by PCR Nose; Future             BMI:   Estimated body mass index is 22.8 kg/m  as calculated from the following:    Height as of 3/4/21: 1.619 m (5' 3.75\").    Weight as of 7/16/21: 59.8 kg (131 lb 12.8 oz).           No follow-ups on file.    RACH Lund [facility]    Subjective   Charleen is a 76 year old who presents for the following health issues     HPI     Concern - needs covid test  Onset:   Description: Needs covid test  Intensity: 0/10  Progression of Symptoms:    Accompanying Signs & Symptoms:   Previous history of similar problem: yes  Precipitating factors:        Worsened by:   Alleviating factors:        Improved by:   Therapies tried and outcome:  none     Patient is a 76 year old female who calls in today for a covid test request. She has [place] on Nov 2nd and needs a covid test. She is not interested in the vaccine at this time. Orders will be placed and I will ask the staff to help the patient schedule a test at [facility] on the 29th or 30th. No other health concerns.     Review of Systems   Constitutional, HEENT, cardiovascular, pulmonary, gi and gu systems are negative, except as otherwise noted.      Objective           Vitals:  No vitals were obtained today due to virtual visit.    Physical Exam   healthy, alert and no distress  PSYCH: Alert and oriented times 3; coherent speech, normal   rate and volume, able to articulate logical thoughts, able   to abstract reason, no tangential thoughts, no hallucinations   or delusions  Her affect is normal  RESP: No cough, no audible " wheezing, able to talk in full sentences  Remainder of exam unable to be completed due to telephone visits            Phone call duration: 7 minutes

## 2021-11-09 ENCOUNTER — OFFICE VISIT (OUTPATIENT)
Dept: FAMILY MEDICINE | Facility: CLINIC | Age: 76
End: 2021-11-09
Payer: COMMERCIAL

## 2021-11-09 VITALS
RESPIRATION RATE: 16 BRPM | HEART RATE: 60 BPM | WEIGHT: 136.4 LBS | TEMPERATURE: 97.8 F | BODY MASS INDEX: 23.6 KG/M2 | DIASTOLIC BLOOD PRESSURE: 62 MMHG | SYSTOLIC BLOOD PRESSURE: 132 MMHG

## 2021-11-09 DIAGNOSIS — M06.9 RHEUMATOID ARTHRITIS, INVOLVING UNSPECIFIED SITE, UNSPECIFIED WHETHER RHEUMATOID FACTOR PRESENT (H): ICD-10-CM

## 2021-11-09 DIAGNOSIS — Z63.4 BEREAVEMENT: ICD-10-CM

## 2021-11-09 DIAGNOSIS — L98.9 SKIN LESION: Primary | ICD-10-CM

## 2021-11-09 DIAGNOSIS — L82.1 SEBORRHEIC KERATOSIS: ICD-10-CM

## 2021-11-09 PROCEDURE — 99214 OFFICE O/P EST MOD 30 MIN: CPT | Performed by: PHYSICIAN ASSISTANT

## 2021-11-09 SDOH — SOCIAL STABILITY - SOCIAL INSECURITY: DISSAPEARANCE AND DEATH OF FAMILY MEMBER: Z63.4

## 2021-11-09 NOTE — PROGRESS NOTES
Assessment & Plan     1. Skin lesion    2. Seborrheic keratosis    3. Bereavement    4. Rheumatoid arthritis, involving unspecified site, unspecified whether rheumatoid factor present (H)      See HPI. Referral for general surgery to biopsy lesion over right medial face. Patient declines counseling or medication to help with grief from loss of spouse.     RACH Lund [facility] JOSE Villaseñor is a 76 year old who presents for the following health issues     HPI     Look at mole- on on right arm, on on right side of face, and 2 on her back  Right side of face - 1-2 months ago - raised, uneven, crusted  Right arm - dark, rough, stuck  Left shoulder - skin tag    Patient is a 76 year old female who presents with concerns about skin lesions. She has several crumbly, stuck on lesions consistent with seborrheic keratosis. However, she reports that the lesion over the right medial face developed over the past month. No personal history of skin cancer or concerning lesions.     Patient lost her spouse to covid this past July 2021. She says that her family has been making certain to support her through her recovery. She says that she has a grandchild with special needs who comes to her home after school. She also is involved in her granddaughter's upcoming wedding. Patient denies thoughts of SI/HI. She said that she feels she has enough support through family and friends and declined offer for ssri or counseling.          Review of Systems   Constitutional, HEENT, cardiovascular, pulmonary, gi and gu systems are negative, except as otherwise noted.      Objective    There were no vitals taken for this visit.  There is no height or weight on file to calculate BMI.  Physical Exam   GENERAL: healthy, alert and no distress  NECK: no adenopathy, no asymmetry, masses, or scars and thyroid normal to palpation  RESP: lungs clear to auscultation - no rales, rhonchi or wheezes  CV:  regular rate and rhythm, normal S1 S2, no S3 or S4, no murmur, click or rub, no peripheral edema and peripheral pulses strong  MS: no gross musculoskeletal defects noted, no edema  SKIN:   Right side of face - 1-2 months ago - raised, uneven, crusted  Right arm - dark, rough, stuck  Left shoulder - skin tag  PSYCH: mentation appears normal, affect normal/bright     All lesions are frozen with LN2 x3. Patient tolerated procedure well.   PLAN:  CARE DISCUSSED. USE OF OTC PRODUCT STARTING IN FEW DAYS. GENTLE ABRAISION WITH PUMICE STONE OR EMERY BOARD WITH GOOD HANDWASHING AFTER. RETURN IN TWO WEEKS FOR REFREEZING UNTIL RESOLVED.

## 2021-11-10 ENCOUNTER — DOCUMENTATION ONLY (OUTPATIENT)
Dept: OTHER | Facility: CLINIC | Age: 76
End: 2021-11-10
Payer: COMMERCIAL

## 2021-11-10 PROBLEM — M06.9 RHEUMATOID ARTHRITIS (H): Status: ACTIVE | Noted: 2021-11-10

## 2021-11-10 NOTE — PATIENT INSTRUCTIONS
Patient Education     Understanding Seborrheic Keratosis  Seborrheic keratoses are wart-like growths on the skin. These growths are not cancer. Most people get these growths when they are middle age or older.    How to say it  [id number]  What causes seborrheic keratoses?  Doctors don't know what causes seborrheic keratoses. They may run in families. They become more common as people get older.   What are the symptoms of seborrheic keratoses?  Here is what seborrheic keratoses often look like:    They tend to be round or oval in shape. They can be very small or about as big across as a quarter.    They can appear singly or in clusters.    They tend to be tan, brown, or black in color.    The edges may be scalloped or uneven-looking.    They can have a waxy or scaly look.    They can be a bit raised, appearing to be  stuck on  the skin.    They may become red and irritated if scratched or rubbed by clothing  Sebhorrheic keratoses are not painful, but they may be itchy. They can become irritated if they are continually rubbed by skin or clothing. Seborrheic keratoses most often appear on the face, arms, chest, back, or belly.   How are seborrheic keratoses treated?  Seborrheic keratoses don t need to be treated unless they bother you. You may choose to have them removed because you find them unattractive. You may also want them removed because they get irritated and uncomfortable. Sometimes, seborrheic keratoses can look like more dangerous skin lesions. Your healthcare provider may remove them (biopsy) to tell the difference. A healthcare provider can remove them in an office visit. Ways that seborrheic keratoses can be removed include:     Freezing them off with liquid nitrogen    Cutting them off with a scalpel    Using electric current to destroy the growth  What are possible complications of seborrheic keratoses?   Seborrheic keratoses are not cancer, but they can look like some types of skin  cancer. So it s a good idea to ask your healthcare provider to check any new skin growths. Ask your healthcare provider about any skin problem that concerns you.   When should I call my healthcare provider?  Call your healthcare provider right away if any of these occur:    You develop a lot of seborrheic keratoses very quickly    You have a sore that doesn't heal within a few weeks, or heals and then comes back    You have a mole or skin growth that is changing in size, shape, or color    You have a mole or skin growth that looks different on one side from the other    You have a mole or skin growth that is not the same color throughout  [employer] last reviewed this educational content on 6/1/2019 2000-2021 [employer]. All rights reserved. This information is not intended as a substitute for professional medical care. Always follow your healthcare professional's instructions.

## 2021-12-01 ENCOUNTER — OFFICE VISIT (OUTPATIENT)
Dept: SURGERY | Facility: CLINIC | Age: 76
End: 2021-12-01
Payer: COMMERCIAL

## 2021-12-01 VITALS
WEIGHT: 137 LBS | BODY MASS INDEX: 23.39 KG/M2 | DIASTOLIC BLOOD PRESSURE: 66 MMHG | TEMPERATURE: 98 F | HEIGHT: 64 IN | SYSTOLIC BLOOD PRESSURE: 124 MMHG

## 2021-12-01 DIAGNOSIS — L98.9 SKIN LESION OF FACE: Primary | ICD-10-CM

## 2021-12-01 DIAGNOSIS — L98.9 SKIN LESION: ICD-10-CM

## 2021-12-01 PROCEDURE — 11102 TANGNTL BX SKIN SINGLE LES: CPT | Mod: XS | Performed by: SURGERY

## 2021-12-01 PROCEDURE — 88305 TISSUE EXAM BY PATHOLOGIST: CPT | Performed by: DERMATOLOGY

## 2021-12-01 PROCEDURE — 11104 PUNCH BX SKIN SINGLE LESION: CPT | Performed by: SURGERY

## 2021-12-01 PROCEDURE — 99203 OFFICE O/P NEW LOW 30 MIN: CPT | Mod: 25 | Performed by: SURGERY

## 2021-12-01 ASSESSMENT — MIFFLIN-ST. JEOR: SCORE: 1083.49

## 2021-12-01 NOTE — PROGRESS NOTES
Patient seen in consultation for skin lesion of the face by Gavino Sauceda    HPI:  Patient is a 76 year old female with history of atypical skin lesions frozen by her PCP. She was referred for a lesion on her face close to her nose and eye that is somewhat suspicious. She also has a keratosis of her left shoulder that irritates her due to her bra strap. She denies pain, itching or drainage from the skin lesion on her face. She is not on blood thinning medications.    Review Of Systems    Skin: as above  Ears/Nose/Throat: negative  Respiratory: No shortness of breath, dyspnea on exertion, cough, or hemoptysis  Cardiovascular: negative  Gastrointestinal: negative  Genitourinary: negative  Musculoskeletal: negative  Neurologic: negative  Hematologic/Lymphatic/Immunologic: negative  Endocrine: negative      No past medical history on file.    Past Surgical History:   Procedure Laterality Date     C APPENDECTOMY       C LIGATE FALLOPIAN TUBE,POSTPARTUM      Tubal Ligation     C VAGINAL HYSTERECTOMY  1981    Hysterectomy, Vaginal       Family History   Problem Relation Age of Onset     Cerebrovascular Disease Mother      Diabetes Mother      Cancer Father        Social History     Socioeconomic History     Marital status:      Spouse name: Not on file     Number of children: Not on file     Years of education: Not on file     Highest education level: Not on file   Occupational History     Not on file   Tobacco Use     Smoking status: Never Smoker     Smokeless tobacco: Never Used   Substance and Sexual Activity     Alcohol use: Yes     Comment: seldom     Drug use: No     Sexual activity: Not Currently   Other Topics Concern     Not on file   Social History Narrative     Not on file     Social Determinants of Health     Financial Resource Strain: Not on file   Food Insecurity: Not on file   Transportation Needs: Not on file   Physical Activity: Not on file   Stress: Not on file   Social Connections: Not on file  "  Intimate Partner Violence: Not on file   Housing Stability: Not on file       Current Outpatient Medications   Medication Sig Dispense Refill     benzonatate (TESSALON) 100 MG capsule Take 1-2 capsules by mouth up to 3 times daily as needed for cough. (Patient not taking: Reported on 7/16/2021) 30 capsule 0     Chlorpheniramine Maleate (ALLERGY RELIEF PO) As needed  (Patient not taking: Reported on 11/9/2021)       fluticasone (FLONASE) 50 MCG/ACT nasal spray Spray 2 sprays into both nostrils daily (Patient not taking: Reported on 10/21/2021) 16 g 3     gabapentin (NEURONTIN) 300 MG capsule Take 1 cap (300mg) at bed x3days, then increase to 2 cap (600mg) at bedtime (Patient not taking: Reported on 7/12/2021) 57 capsule 0     HYDROcodone-acetaminophen (NORCO) 5-325 MG tablet Take 1 tablet by mouth every 4 hours as needed (Patient not taking: Reported on 7/12/2021) 15 tablet 0     HYDROcodone-acetaminophen (NORCO) 5-325 MG tablet Take 1 tablet by mouth every 4 hours as needed for pain (Patient not taking: Reported on 7/12/2021) 3 tablet 0     naproxen sodium 220 MG capsule Take 220 mg by mouth 2 times daily (with meals) (Patient not taking: Reported on 7/16/2021)       tiZANidine (ZANAFLEX) 2 MG tablet Take 1 tablet (2 mg) by mouth 3 times daily as needed for muscle spasms (Patient not taking: Reported on 7/12/2021) 20 tablet 0       Medications and history reviewed    Physical exam:  Vitals: /66   Temp 98  F (36.7  C) (Temporal)   Ht 1.613 m (5' 3.5\")   Wt 62.1 kg (137 lb)   BMI 23.89 kg/m    BMI= Body mass index is 23.89 kg/m .    Constitutional: Healthy, alert, non-distressed   Head: Normo-cephalic, atraumatic, no lesions, masses or tenderness   Cardiovascular: RRR, no new murmurs, +S1, +S2 heart sounds, no clicks, rubs or gallops   Respiratory: CTAB, no rales, rhonchi or wheezing, equal chest rise, good respiratory effort   Gastrointestinal: Soft, non-tender, non distended, no rebound rigidity or " guarding, no masses or hernias palpated   : Deferred  Musculoskeletal: Moves all extremities, normal  strength, no deformities noted   Skin: right face under eye raised, pale, slightly scabbed skin lesion ~4mm, left shoulder actinic keratosis.   Psychiatric: Mentation appears normal, affect appropriate   Hematologic/Lymphatic/Immunologic: Normal cervical and supraclavicular lymph nodes   Patient able to get up on table without difficulty.    Labs show:  No results found for this or any previous visit (from the past 24 hour(s)).    Imaging shows:  none    Assessment:     ICD-10-CM    1. Skin lesion of face  L98.9      Plan: I recommend removal of irritated keratosis of left shoulder and punch biopsy of skin lesion on face for tissue diagnosis. We discussed the procedure in detail. We also discussed the risks, benefits, alternatives  After our informed discussion we decided to proceed with the proposed procedure.    PROCEDURE: 3mm punch biopsy skin lesion right face, shave excision of left shoulder skin lesion   Written consent was obtained    The left shoulder and right face area was prepped and appropriately anesthetized with 1% lidocaine with epinephrine.     Using the usual technique, 3mm punch biopsy of face lesion was performed. The total area excised, including lesion and margins was 0.3 cm.  Closure was accomplished with interrupted 4-0 monocryl for the skin. Specimen was sent to Pathology.    Left shoulder keratosis was elevated and peeled away. Minimal sharp dissection with scissors. Band-aid applied. Not sent for pathology.        Khalif Butt DO

## 2021-12-01 NOTE — LETTER
12/1/2021         RE: Charleen Childress  [address]  [facility] [address]        Dear Colleague,    Thank you for referring your patient, Charleen Childress, to the [facility]. Please see a copy of my visit note below.    Patient seen in consultation for skin lesion of the face by Gavino Sauceda    HPI:  Patient is a 76 year old female with history of atypical skin lesions frozen by her PCP. She was referred for a lesion on her face close to her nose and eye that is somewhat suspicious. She also has a keratosis of her left shoulder that irritates her due to her bra strap. She denies pain, itching or drainage from the skin lesion on her face. She is not on blood thinning medications.    Review Of Systems    Skin: as above  Ears/Nose/Throat: negative  Respiratory: No shortness of breath, dyspnea on exertion, cough, or hemoptysis  Cardiovascular: negative  Gastrointestinal: negative  Genitourinary: negative  Musculoskeletal: negative  Neurologic: negative  Hematologic/Lymphatic/Immunologic: negative  Endocrine: negative      No past medical history on file.    Past Surgical History:   Procedure Laterality Date     C APPENDECTOMY       C LIGATE FALLOPIAN TUBE,POSTPARTUM      Tubal Ligation     C VAGINAL HYSTERECTOMY  1981    Hysterectomy, Vaginal       Family History   Problem Relation Age of Onset     Cerebrovascular Disease Mother      Diabetes Mother      Cancer Father        Social History     Socioeconomic History     Marital status:      Spouse name: Not on file     Number of children: Not on file     Years of education: Not on file     Highest education level: Not on file   Occupational History     Not on file   Tobacco Use     Smoking status: Never Smoker     Smokeless tobacco: Never Used   Substance and Sexual Activity     Alcohol use: Yes     Comment: seldom     Drug use: No     Sexual activity: Not Currently   Other Topics Concern     Not on file   Social History  "Narrative     Not on file     Social Determinants of Health     Financial Resource Strain: Not on file   Food Insecurity: Not on file   Transportation Needs: Not on file   Physical Activity: Not on file   Stress: Not on file   Social Connections: Not on file   Intimate Partner Violence: Not on file   Housing Stability: Not on file       Current Outpatient Medications   Medication Sig Dispense Refill     benzonatate (TESSALON) 100 MG capsule Take 1-2 capsules by mouth up to 3 times daily as needed for cough. (Patient not taking: Reported on 7/16/2021) 30 capsule 0     Chlorpheniramine Maleate (ALLERGY RELIEF PO) As needed  (Patient not taking: Reported on 11/9/2021)       fluticasone (FLONASE) 50 MCG/ACT nasal spray Spray 2 sprays into both nostrils daily (Patient not taking: Reported on 10/21/2021) 16 g 3     gabapentin (NEURONTIN) 300 MG capsule Take 1 cap (300mg) at bed x3days, then increase to 2 cap (600mg) at bedtime (Patient not taking: Reported on 7/12/2021) 57 capsule 0     HYDROcodone-acetaminophen (NORCO) 5-325 MG tablet Take 1 tablet by mouth every 4 hours as needed (Patient not taking: Reported on 7/12/2021) 15 tablet 0     HYDROcodone-acetaminophen (NORCO) 5-325 MG tablet Take 1 tablet by mouth every 4 hours as needed for pain (Patient not taking: Reported on 7/12/2021) 3 tablet 0     naproxen sodium 220 MG capsule Take 220 mg by mouth 2 times daily (with meals) (Patient not taking: Reported on 7/16/2021)       tiZANidine (ZANAFLEX) 2 MG tablet Take 1 tablet (2 mg) by mouth 3 times daily as needed for muscle spasms (Patient not taking: Reported on 7/12/2021) 20 tablet 0       Medications and history reviewed    Physical exam:  Vitals: /66   Temp 98  F (36.7  C) (Temporal)   Ht 1.613 m (5' 3.5\")   Wt 62.1 kg (137 lb)   BMI 23.89 kg/m    BMI= Body mass index is 23.89 kg/m .    Constitutional: Healthy, alert, non-distressed   Head: Normo-cephalic, atraumatic, no lesions, masses or tenderness "   Cardiovascular: RRR, no new murmurs, +S1, +S2 heart sounds, no clicks, rubs or gallops   Respiratory: CTAB, no rales, rhonchi or wheezing, equal chest rise, good respiratory effort   Gastrointestinal: Soft, non-tender, non distended, no rebound rigidity or guarding, no masses or hernias palpated   : Deferred  Musculoskeletal: Moves all extremities, normal  strength, no deformities noted   Skin: right face under eye raised, pale, slightly scabbed skin lesion ~4mm, left shoulder actinic keratosis.   Psychiatric: Mentation appears normal, affect appropriate   Hematologic/Lymphatic/Immunologic: Normal cervical and supraclavicular lymph nodes   Patient able to get up on table without difficulty.    Labs show:  No results found for this or any previous visit (from the past 24 hour(s)).    Imaging shows:  none    Assessment:     ICD-10-CM    1. Skin lesion of face  L98.9      Plan: I recommend removal of irritated keratosis of left shoulder and punch biopsy of skin lesion on face for tissue diagnosis. We discussed the procedure in detail. We also discussed the risks, benefits, alternatives  After our informed discussion we decided to proceed with the proposed procedure.    PROCEDURE: 3mm punch biopsy skin lesion right face, shave excision of left shoulder skin lesion   Written consent was obtained    The left shoulder and right face area was prepped and appropriately anesthetized with 1% lidocaine with epinephrine.     Using the usual technique, 3mm punch biopsy of face lesion was performed. The total area excised, including lesion and margins was 0.3 cm.  Closure was accomplished with interrupted 4-0 monocryl for the skin. Specimen was sent to Pathology.    Left shoulder keratosis was elevated and peeled away. Minimal sharp dissection with scissors. Band-aid applied. Not sent for pathology.        Khalif Butt, DO        Again, thank you for allowing me to participate in the care of your patient.         Sincerely,        Khalif Butt, DO

## 2021-12-03 ENCOUNTER — TELEPHONE (OUTPATIENT)
Dept: SURGERY | Facility: CLINIC | Age: 76
End: 2021-12-03
Payer: COMMERCIAL

## 2021-12-03 LAB
PATH REPORT.COMMENTS IMP SPEC: NORMAL
PATH REPORT.COMMENTS IMP SPEC: NORMAL
PATH REPORT.FINAL DX SPEC: NORMAL
PATH REPORT.GROSS SPEC: NORMAL
PATH REPORT.MICROSCOPIC SPEC OTHER STN: NORMAL
PATH REPORT.RELEVANT HX SPEC: NORMAL

## 2022-06-17 ENCOUNTER — NURSE TRIAGE (OUTPATIENT)
Dept: FAMILY MEDICINE | Facility: CLINIC | Age: 77
End: 2022-06-17
Payer: COMMERCIAL

## 2022-06-17 NOTE — TELEPHONE ENCOUNTER
"Patient stated after removing a wood tick on Wednesday evening, she has had increasing symptoms on the left side of her body.  She stated on her left arm and under her breast she has developed a rash, warmness on her skin to the touch, and the areas are very itchy.  Per protocol, patient to be seen in clinic today.  No availability in [facility] at this time.  Advised patient to seek urgent/ emergent care per protocol at this time.  Patient stated understanding.    Reason for Disposition    Widespread rash occurs, 2 to 14 days following the bite    Additional Information    Negative: Not a tick bite    Negative: Patient sounds very sick or weak to the triager    Answer Assessment - Initial Assessment Questions  1. TYPE of TICK: \"Is it a wood tick or a deer tick?\" If unsure, ask: \"What size was the tick?\" \"Did it look more like a watermelon seed or a poppy seed?\"       Wood tick    2. LOCATION: \"Where is the tick bite located?\"       Under left breast    3. ONSET: \"How long do you think the tick was attached before you removed it?\" (Hours or days)       Sometime during day Wednesday, off that evening    4. TETANUS: \"When was the last tetanus booster?\"       NA    5. PREGNANCY: \"Is there any chance you are pregnant?\" \"When was your last menstrual period?\"      NA    Protocols used: TICK BITE-A-[place]    Jackie Strauss RN      "

## 2022-09-03 ENCOUNTER — LAB (OUTPATIENT)
Dept: LAB | Facility: CLINIC | Age: 77
End: 2022-09-03
Payer: COMMERCIAL

## 2022-09-03 DIAGNOSIS — Z20.822 ENCOUNTER FOR LABORATORY TESTING FOR COVID-19 VIRUS: ICD-10-CM

## 2022-09-03 PROCEDURE — U0005 INFEC AGEN DETEC AMPLI PROBE: HCPCS

## 2022-09-03 PROCEDURE — U0003 INFECTIOUS AGENT DETECTION BY NUCLEIC ACID (DNA OR RNA); SEVERE ACUTE RESPIRATORY SYNDROME CORONAVIRUS 2 (SARS-COV-2) (CORONAVIRUS DISEASE [COVID-19]), AMPLIFIED PROBE TECHNIQUE, MAKING USE OF HIGH THROUGHPUT TECHNOLOGIES AS DESCRIBED BY CMS-2020-01-R: HCPCS

## 2022-09-04 LAB — SARS-COV-2 RNA RESP QL NAA+PROBE: NEGATIVE

## 2023-05-05 ENCOUNTER — TELEPHONE (OUTPATIENT)
Dept: FAMILY MEDICINE | Facility: OTHER | Age: 78
End: 2023-05-05
Payer: COMMERCIAL

## 2023-05-05 NOTE — TELEPHONE ENCOUNTER
INCOMING FORMS    Sender: [employer]    Type of Form, letter or note (What is requested?): Pre-op    How was the form received?: found in MA bin with a note stating to keep on hand for if she rescheduled another pre-op.     How should forms be returned?:  N/A and will have to wait until she schedules    Form placed in TC's hold bin.

## 2023-05-22 ENCOUNTER — TELEPHONE (OUTPATIENT)
Dept: CARDIOLOGY | Facility: CLINIC | Age: 78
End: 2023-05-22

## 2023-05-22 ENCOUNTER — OFFICE VISIT (OUTPATIENT)
Dept: FAMILY MEDICINE | Facility: OTHER | Age: 78
End: 2023-05-22
Payer: COMMERCIAL

## 2023-05-22 VITALS
SYSTOLIC BLOOD PRESSURE: 118 MMHG | WEIGHT: 147 LBS | RESPIRATION RATE: 18 BRPM | OXYGEN SATURATION: 98 % | DIASTOLIC BLOOD PRESSURE: 64 MMHG | BODY MASS INDEX: 26.05 KG/M2 | HEART RATE: 47 BPM | HEIGHT: 63 IN | TEMPERATURE: 98.8 F

## 2023-05-22 DIAGNOSIS — G56.02 CARPAL TUNNEL SYNDROME OF LEFT WRIST: ICD-10-CM

## 2023-05-22 DIAGNOSIS — E85.82 WILD-TYPE TRANSTHYRETIN-RELATED (ATTR) AMYLOIDOSIS (H): ICD-10-CM

## 2023-05-22 DIAGNOSIS — Z01.818 PREOP GENERAL PHYSICAL EXAM: Primary | ICD-10-CM

## 2023-05-22 DIAGNOSIS — I48.91 ATRIAL FIBRILLATION, UNSPECIFIED TYPE (H): ICD-10-CM

## 2023-05-22 DIAGNOSIS — M06.9 RHEUMATOID ARTHRITIS, INVOLVING UNSPECIFIED SITE, UNSPECIFIED WHETHER RHEUMATOID FACTOR PRESENT (H): ICD-10-CM

## 2023-05-22 LAB
ANION GAP SERPL CALCULATED.3IONS-SCNC: 7 MMOL/L (ref 7–15)
BUN SERPL-MCNC: 21 MG/DL (ref 8–23)
CALCIUM SERPL-MCNC: 9.5 MG/DL (ref 8.8–10.2)
CHLORIDE SERPL-SCNC: 106 MMOL/L (ref 98–107)
CREAT SERPL-MCNC: 1.04 MG/DL (ref 0.51–0.95)
DEPRECATED HCO3 PLAS-SCNC: 26 MMOL/L (ref 22–29)
ERYTHROCYTE [DISTWIDTH] IN BLOOD BY AUTOMATED COUNT: 14.3 % (ref 10–15)
GFR SERPL CREATININE-BSD FRML MDRD: 55 ML/MIN/1.73M2
GLUCOSE SERPL-MCNC: 98 MG/DL (ref 70–99)
HCT VFR BLD AUTO: 40.5 % (ref 35–47)
HGB BLD-MCNC: 12.8 G/DL (ref 11.7–15.7)
MCH RBC QN AUTO: 29.8 PG (ref 26.5–33)
MCHC RBC AUTO-ENTMCNC: 31.6 G/DL (ref 31.5–36.5)
MCV RBC AUTO: 94 FL (ref 78–100)
PLATELET # BLD AUTO: 184 10E3/UL (ref 150–450)
POTASSIUM SERPL-SCNC: 4.5 MMOL/L (ref 3.4–5.3)
RBC # BLD AUTO: 4.3 10E6/UL (ref 3.8–5.2)
SODIUM SERPL-SCNC: 139 MMOL/L (ref 136–145)
WBC # BLD AUTO: 5.9 10E3/UL (ref 4–11)

## 2023-05-22 PROCEDURE — 36415 COLL VENOUS BLD VENIPUNCTURE: CPT | Performed by: PHYSICIAN ASSISTANT

## 2023-05-22 PROCEDURE — 80048 BASIC METABOLIC PNL TOTAL CA: CPT | Performed by: PHYSICIAN ASSISTANT

## 2023-05-22 PROCEDURE — 99214 OFFICE O/P EST MOD 30 MIN: CPT | Performed by: PHYSICIAN ASSISTANT

## 2023-05-22 PROCEDURE — 85027 COMPLETE CBC AUTOMATED: CPT | Performed by: PHYSICIAN ASSISTANT

## 2023-05-22 PROCEDURE — 93000 ELECTROCARDIOGRAM COMPLETE: CPT | Performed by: PHYSICIAN ASSISTANT

## 2023-05-22 RX ORDER — ALBUTEROL SULFATE 90 UG/1
AEROSOL, METERED RESPIRATORY (INHALATION)
COMMUNITY
Start: 2023-05-03 | End: 2023-06-20

## 2023-05-22 RX ORDER — INHALER, ASSIST DEVICES
SPACER (EA) MISCELLANEOUS
COMMUNITY
Start: 2023-05-03 | End: 2023-06-20

## 2023-05-22 RX ORDER — IBUPROFEN 200 MG
200 TABLET ORAL EVERY 4 HOURS PRN
COMMUNITY
Start: 2023-05-22 | End: 2023-06-20

## 2023-05-22 ASSESSMENT — PAIN SCALES - GENERAL: PAINLEVEL: NO PAIN (0)

## 2023-05-22 NOTE — PATIENT INSTRUCTIONS
For informational purposes only. Not to replace the advice of your health care provider. Copyright   2003,  [employer] [facility]. All rights reserved. Clinically reviewed by Althea Pal MD. Replenish [id number] - REV .  Preparing for Your Surgery  Getting started  A nurse will call you to review your health history and instructions. They will give you an arrival time based on your scheduled surgery time. Please be ready to share:  Your doctor's clinic name and phone number  Your medical, surgical, and anesthesia history  A list of allergies and sensitivities  A list of medicines, including herbal treatments and over-the-counter drugs  Whether the patient has a legal guardian (ask how to send us the papers in advance)  Please tell us if you're pregnant--or if there's any chance you might be pregnant. Some surgeries may injure a fetus (unborn baby), so they require a pregnancy test. Surgeries that are safe for a fetus don't always need a test, and you can choose whether to have one.   If you have a child who's having surgery, please ask for a copy of Preparing for Your Child's Surgery.    Preparing for surgery  Within 10 to 30 days of surgery: Have a pre-op exam (sometimes called an H&P, or History and Physical). This can be done at a clinic or pre-operative center.  If you're having a , you may not need this exam. Talk to your care team.  At your pre-op exam, talk to your care team about all medicines you take. If you need to stop any medicines before surgery, ask when to start taking them again.  We do this for your safety. Many medicines can make you bleed too much during surgery. Some change how well surgery (anesthesia) drugs work.  Call your insurance company to let them know you're having surgery. (If you don't have insurance, call [phone].)  Call your clinic if there's any change in your health. This includes signs of a cold or flu (sore throat, runny nose, cough, rash, fever). It  also includes a scrape or scratch near the surgery site.  If you have questions on the day of surgery, call your hospital or surgery center.  Eating and drinking guidelines  For your safety: Unless your surgeon tells you otherwise, follow the guidelines below.  Eat and drink as usual until 8 hours before you arrive for surgery. After that, no food or milk.  Drink clear liquids until 2 hours before you arrive. These are liquids you can see through, like water, Gatorade, and Propel Water. They also include plain black coffee and tea (no cream or milk), candy, and breath mints. You can spit out gum when you arrive.  If you drink alcohol: Stop drinking it the night before surgery.  If your care team tells you to take medicine on the morning of surgery, it's okay to take it with a sip of water.  Preventing infection  Shower or bathe the night before and morning of your surgery. Follow the instructions your clinic gave you. (If no instructions, use regular soap.)  Don't shave or clip hair near your surgery site. We'll remove the hair if needed.  Don't smoke or vape the morning of surgery. You may chew nicotine gum up to 2 hours before surgery. A nicotine patch is okay.  Note: Some surgeries require you to completely quit smoking and nicotine. Check with your surgeon.  Your care team will make every effort to keep you safe from infection. We will:  Clean our hands often with soap and water (or an alcohol-based hand rub).  Clean the skin at your surgery site with a special soap that kills germs.  Give you a special gown to keep you warm. (Cold raises the risk of infection.)  Wear special hair covers, masks, gowns and gloves during surgery.  Give antibiotic medicine, if prescribed. Not all surgeries need antibiotics.  What to bring on the day of surgery  Photo ID and insurance card  Copy of your health care directive, if you have one  Glasses and hearing aids (bring cases)  You can't wear contacts during surgery  Inhaler and  eye drops, if you use them (tell us about these when you arrive)  CPAP machine or breathing device, if you use them  A few personal items, if spending the night  If you have . . .  A pacemaker, ICD (cardiac defibrillator) or other implant: Bring the ID card.  An implanted stimulator: Bring the remote control.  A legal guardian: Bring a copy of the certified (court-stamped) guardianship papers.  Please remove any jewelry, including body piercings. Leave jewelry and other valuables at home.  If you're going home the day of surgery  You must have a responsible adult drive you home. They should stay with you overnight as well.  If you don't have someone to stay with you, and you aren't safe to go home alone, we may keep you overnight. Insurance often won't pay for this.  After surgery  If it's hard to control your pain or you need more pain medicine, please call your surgeon's office.  Questions?   If you have any questions for your care team, list them here: _________________________________________________________________________________________________________________________________________________________________________ ____________________________________ ____________________________________ ____________________________________

## 2023-05-22 NOTE — PROGRESS NOTES
Chief Complaint: Atrial fibrillation    HPI (5/23/23): Charleen Childress is a 77 year old female with a past medical history of wild-type transthyretin related amyloidosis, rheumatoid arthritis, and carpal tunnel syndrome of her left wrist who was referred to me for evaluation of atrial fibrillation by Mr. Gavino Sauceda.     Briefly, Ms. Childress was in her usual state of health until she sought preoperative evaluation prior to left carpal tunnel release earlier today.  At that time, an EKG was obtained.  This showed that she had atrial fibrillation.  Ms. Childress was started on apixaban and she was subsequently referred to cardiology for further evaluation.    Of note, the patient was initially diagnosed with wild-type ATTR amyloidosis in 2021 when she had a right carpal tunnel release and the pathology was notable for positive [place] red stain with positive mass spectrometry.  She did subsequently have a technetium pyrophosphate scan to evaluate for cardiac involvement.  This was done in August 2022 and was essentially equivocal    Today, Ms. Childress notes that her only complaint is daytime fatigue.  She notes that this has been going on since she got COVID approximately 2 to 3 years ago.  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  Ms. Childress notes an absence of chest pain at rest, dyspnea at rest or with exertion, PND, orthopnea, peripheral edema, palpitations, lightheadedness or syncope.  Her home blood pressure on Monday was 118/62.    A comprehensive ROS was done and the details are included above in the HPI.    Past Medical History:  Atrial fibrillation, likely longstanding persistent (diagnosed incidentally on preoperative EKG in May 2023)  - No prior history of hypertension, T2DM, dyslipidemia, CAD, TIA/stroke, vascular aneurysms, PAD    Past Surgical History:  Past Surgical History:   Procedure  Laterality Date     ZZC APPENDECTOMY       ZZC LIGATE FALLOPIAN TUBE,POSTPARTUM      Tubal Ligation     ZZC VAGINAL HYSTERECTOMY  1981    Hysterectomy, Vaginal       Drug History:  Home cardiac meds: Apixaban 2.5 mg twice daily.  Current Outpatient Medications   Medication Sig Dispense Refill     albuterol (PROAIR HFA/PROVENTIL HFA/VENTOLIN HFA) 108 (90 Base) MCG/ACT inhaler INHALE 1 PUFF BY MOUTH 4 TIMES PER DAY AS NEEDED FOR COUGH FOR 7 DAYS       apixaban ANTICOAGULANT (ELIQUIS ANTICOAGULANT) 2.5 MG tablet Take 1 tablet (2.5 mg) by mouth 2 times daily 60 tablet 2     ibuprofen (ADVIL/MOTRIN) 200 MG tablet Take 1 tablet (200 mg) by mouth every 4 hours as needed for pain       spacer (OPTICHAMBER VANDANA) holding chamber USE AS DIRECTED WITH INHALER       Chlorpheniramine Maleate (ALLERGY RELIEF PO) As needed  (Patient not taking: Reported on 11/9/2021)           Family History:  - No family history of sudden cardiac death, premature CAD, valvular disorders  Family History   Problem Relation Age of Onset     Cerebrovascular Disease Mother      Diabetes Mother      Cancer Father        Social History:  Social History     Tobacco Use     Smoking status: Never     Smokeless tobacco: Never   Vaping Use     Vaping status: Never Used   Substance Use Topics     Alcohol use: Yes     Comment: seldom       Allergies   Allergen Reactions     No Known Drug Allergy          Physical Examination:  Vitals: BP (!) 158/82 (BP Location: Left arm, Patient Position: Chair, Cuff Size: Adult Regular)   Pulse 60   Wt 66.5 kg (146 lb 8 oz)   SpO2 98%   BMI 25.64 kg/m    BMI= Body mass index is 25.64 kg/m .   Recheck blood pressure: 159/76    GENERAL: Healthy, alert and no distress  Eyes: No xanthelasmas.  NECK: No palpable neck masses/goitre and no evidence of retrosternal goitre.   ENT: Moist mucosal membranes.  RESPIRATORY: No signs of resp distress. Lungs CTAB.  CARDIOVASCULAR:  No JVD, irregularly irregular, normal S1+S2 without  added sounds or murmurs.  ABDOMEN: ND, soft, non-tender, normal bowel sounds.  EXTREMITIES: Warm, well-perfused, no edema.   NEUROLOGY: GCS 15/15, no focal deficits.  VASC: No carotid bruits. Carotid, radial pulses are normal in volumes and symmetric bilaterally.   SKIN: No ecchymoses, no rashes.  PSYCH: Cooperative, pleasant affect.       Investigations:  I personally viewed and interpreted the following investigations    Labs:    CBC RESULTS:  Lab Results   Component Value Date    WBC 5.9 05/22/2023    WBC 5.9 03/04/2021    RBC 4.30 05/22/2023    RBC 4.20 03/04/2021    HGB 12.8 05/22/2023    HGB 12.7 03/04/2021    HCT 40.5 05/22/2023    HCT 39.6 03/04/2021    MCV 94 05/22/2023    MCV 94 03/04/2021    MCH 29.8 05/22/2023    MCH 30.2 03/04/2021    MCHC 31.6 05/22/2023    MCHC 32.1 03/04/2021    RDW 14.3 05/22/2023    RDW 12.9 03/04/2021     05/22/2023     03/04/2021       CMP RESULTS:  Lab Results   Component Value Date     05/22/2023     03/04/2021    POTASSIUM 4.5 05/22/2023    POTASSIUM 4.1 07/16/2021    POTASSIUM 4.4 03/04/2021    CHLORIDE 106 05/22/2023    CHLORIDE 105 07/16/2021    CHLORIDE 109 03/04/2021    CO2 26 05/22/2023    CO2 27 07/16/2021    CO2 29 03/04/2021    ANIONGAP 7 05/22/2023    ANIONGAP 7 07/16/2021    ANIONGAP 2 (L) 03/04/2021    GLC 98 05/22/2023    GLC 97 07/16/2021    GLC 89 03/04/2021    BUN 21.0 05/22/2023    BUN 25 07/16/2021    BUN 21 03/04/2021    CR 1.04 (H) 05/22/2023    CR 0.90 03/04/2021    GFRESTIMATED 55 (L) 05/22/2023    GFRESTIMATED 62 03/04/2021    GFRESTBLACK 72 03/04/2021    ALEM 9.5 05/22/2023    ALEM 9.3 03/04/2021    BILITOTAL 0.4 07/16/2021    BILITOTAL 0.6 11/03/2020    ALBUMIN 3.6 07/16/2021    ALBUMIN 3.8 11/03/2020    ALKPHOS 80 07/16/2021    ALKPHOS 67 11/03/2020    ALT 38 07/16/2021    ALT 19 11/03/2020    AST 67 (H) 07/16/2021    AST 20 11/03/2020          LIPIDS:  Lab Results   Component Value Date    CHOL 244 03/28/2002     Lab Results    Component Value Date    HDL 54 03/28/2002     Lab Results   Component Value Date     03/28/2002     Lab Results   Component Value Date    TRIG 95 03/28/2002     Lab Results   Component Value Date    CHOLHDLRATIO 4.51 03/28/2002       Recent Tests:    Electrocardiogram (5/22/2023):  Atrial fibrillation with slow ventricular response.    Assessment and Plan:   Charleen Childress is a 77 year old female with recently diagnosed fibrillation who came to see me for the same in May 2023.    Ms. Childress exhibits an excellent functional status despite her atrial fibrillation.  However, given the new diagnosis and the fact that she does have fatigue, I did offer her cardioversion to see if her fatigue would improve.  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  In view of this, Ms. Childress said that she will reach out to us about scheduling her DCCV.  Given her history of amyloid however, I would also like her to have a CMR at some point (given her equivocal PYP scan at [facility]) and see my colleague Dr. Vineet Tamayo in our Amyloid Clinic.    Separately, I note that she was hypertensive in clinic today.  Given that her blood pressure is 118/62 at home, I doubt that this is a longstanding hypertension.  However I have asked her to monitor her blood pressure regularly at home and I will have our clinic staff reach out to her.    Finally, I recognize that Ms. Childress had originally seen her primary care clinician for preoperative cardiac risk stratification prior to a carpal tunnel surgery.  She is able to walk up a flight of stairs.  In view of this, I do not feel that any further stratification studies are needed.  I expect that she will be asked to hold her apixaban ideally, I would like her to restart her apixaban as soon after the surgery as is possible.  She does not need heparin  bridging.    Problems:  Atrial fibrillation, likely longstanding persistent ([id number] score of 3-4)  Elevated blood pressure in clinic  Wild-type TTR amyloidosis    Plan:  - DCCV  - Lipid panel (will try to add on to labs from 522)  - Continue apixaban 2.5 mg twice daily  - Cardiac CMR and amyloid clinic referral after surgery/DCCV    A total of 60 minutes were spent on the day of the visit for chart review, care coordination, face-to-face consultation with the patient, and documentation.       Benitez Rivero [facility] [place]    Cardiovascular Division  Pager [phone]    CC  Patient Care Team:  Gavino Sauceda PA-C as PCP - General (Physician Assistant)  Gavino Sauceda PA-C as Assigned PCP  Khalif Butt DO as Assigned Surgical Provider

## 2023-05-22 NOTE — PROGRESS NOTES
[address]  [facility] [address]  Phone: [phone]  Primary Provider: Jeremie Gamboa  Pre-op Performing Provider: JEREMIE GAMBOA    PREOPERATIVE EVALUATION:  Today's date: 5/22/2023    Charleen Childress is a 77 year old female who presents for a preoperative evaluation.       View : No data to display.              Surgical Information:  Surgery/Procedure: Carpal tunnel release Lt side  Surgery Location: [facility]  Surgeon: Dr. Restrepo  Surgery Date: 5/26/23  Time of Surgery: Unknown  Where patient plans to recover: At home with family  Fax number for surgical facility: [phone]    Assessment & Plan     The proposed surgical procedure is considered INTERMEDIATE risk.    Preop general physical exam  Carpal tunnel syndrome of left wrist  Patient is experiencing muscular atrophy in the left hand from the carpal tunnel. In addition, she reports little to no sensation in the 1st-3rd fingers. This has resulted in her burning the tip of her left index finger while cooking. Per her report it is unclear as to whether this will require amputation. Patient was educated on prep for upcoming procedure. She will be in communication with her surgical team for further instruction. Reference material attached to AVS.   - EKG 12-lead complete w/read - Clinics  - CBC with platelets; Future  - Basic metabolic panel  (Ca, Cl, CO2, Creat, Gluc, K, Na, BUN); Future  - Basic metabolic panel  (Ca, Cl, CO2, Creat, Gluc, K, Na, BUN)  - CBC with platelets    Atrial fibrillation (H)  Patient's EKG returned with finding of atrial fibrillation. Her CHADsVasc score is 3. She will be started on eliquis 2.5mg twice daily. I did discuss with her that the procedure will need to be delayed until she is able to meet with a cardiologist and receive approval. Priority referral has been placed.   - Adult Cardiology Eval  Referral; Future  -  apixaban ANTICOAGULANT (ELIQUIS ANTICOAGULANT) 2.5 MG tablet; Take 1 tablet (2.5 mg) by mouth 2 times daily    Wild-type transthyretin-related (ATTR) amyloidosis (H)  Identified at the time of her right carpal tunnel repair. Patient was seen by [facility], Dr. Rubio, on 08/29/22. Review of this note shows that the echocardiogram from 08/15/22 had no evidence of cardiac amyloidosis. Nor was any other organ involvement noted.     Rheumatoid arthritis, involving unspecified site, unspecified whether rheumatoid factor present (H)  Has been managing with NSAIDs, but with the addition of oral anticoagulants was advised against NSAID use. She will rely on tylenol for pains.        Risks and Recommendations:  The patient has the following additional risks and recommendations for perioperative complications:  Cardiovascular:   - Atrial fibrillation identified on EKG during appointment. No previous history of arrhythmia. Patient was instructed to meet with cardiologist for approval of procedure given this finding.     Antiplatelet or Anticoagulation Medication Instructions:   - apixaban (Eliquis), edoxaban (Savaysa), rivaroxaban (Xarelto): Bleeding risk is moderate or high for this procedure AND CrCl  (>=) 50 mL/min. HOLD 2 days before surgery.     Additional Medication Instructions:   - ibuprofen (Advil, Motrin): HOLD 1 day before surgery.     RECOMMENDATION:  Patient referred to Cardiology for evaluation before surgery. Surgery approval pending completion of consultation.    Subjective       HPI related to upcoming procedure:   Patient has been scheduled for left carpal tunnel repair with [facility].         5/22/2023     9:46 AM   Preop Questions   1. Have you ever had a heart attack or stroke? No   2. Have you ever had surgery on your heart or blood vessels, such as a stent placement, a coronary artery bypass, or surgery on an artery in your head, neck, heart, or legs? No   3. Do you have chest  pain with activity? No   4. Do you have a history of  heart failure? No   5. Do you currently have a cold, bronchitis or symptoms of other infection? No   6. Do you have a cough, shortness of breath, or wheezing? UNKNOWN - patient reports URI for past 1-2 months. Feels symptoms have been improving over the past week    7. Do you or anyone in your family have previous history of blood clots? YES   8. Do you or does anyone in your family have a serious bleeding problem such as prolonged bleeding following surgeries or cuts? No   9. Have you ever had problems with anemia or been told to take iron pills? No   10. Have you had any abnormal blood loss such as black, tarry or bloody stools, or abnormal vaginal bleeding? No   11. Have you ever had a blood transfusion? No   12. Are you willing to have a blood transfusion if it is medically needed before, during, or after your surgery? Yes   13. Have you or any of your relatives ever had problems with anesthesia? No   14. Do you have sleep apnea, excessive snoring or daytime drowsiness? No   15. Do you have any artifical heart valves or other implanted medical devices like a pacemaker, defibrillator, or continuous glucose monitor? No   16. Do you have artificial joints? No   17. Are you allergic to latex? No       Health Care Directive:  Patient has a Health Care Directive on file      Preoperative Review of :   reviewed - no record of controlled substances prescribed.    Status of Chronic Conditions:  See problem list for active medical problems.  Problems all longstanding and stable, except as noted/documented.  See ROS for pertinent symptoms related to these conditions.      Review of Systems  Constitutional, neuro, ENT, endocrine, pulmonary, cardiac, gastrointestinal, genitourinary, musculoskeletal, integument and psychiatric systems are negative, except as otherwise noted.    Patient Active Problem List    Diagnosis Date Noted     Rheumatoid arthritis (H)  11/10/2021     Priority: Medium     Carpal tunnel syndrome of right wrist 02/01/2021     Priority: Medium     Added automatically from request for surgery [id number]       CARDIOVASCULAR SCREENING; LDL GOAL LESS THAN 160 10/31/2010     Priority: Medium     Viral warts 11/24/2006     Priority: Medium     Problem list name updated by automated process. Provider to review        No past medical history on file.  Past Surgical History:   Procedure Laterality Date     ZZC APPENDECTOMY       ZZC LIGATE FALLOPIAN TUBE,POSTPARTUM      Tubal Ligation     ZZC VAGINAL HYSTERECTOMY  1981    Hysterectomy, Vaginal     Current Outpatient Medications   Medication Sig Dispense Refill     benzonatate (TESSALON) 100 MG capsule Take 1-2 capsules by mouth up to 3 times daily as needed for cough. (Patient not taking: Reported on 7/16/2021) 30 capsule 0     Chlorpheniramine Maleate (ALLERGY RELIEF PO) As needed  (Patient not taking: Reported on 11/9/2021)       fluticasone (FLONASE) 50 MCG/ACT nasal spray Spray 2 sprays into both nostrils daily (Patient not taking: Reported on 10/21/2021) 16 g 3     gabapentin (NEURONTIN) 300 MG capsule Take 1 cap (300mg) at bed x3days, then increase to 2 cap (600mg) at bedtime (Patient not taking: Reported on 7/12/2021) 57 capsule 0     HYDROcodone-acetaminophen (NORCO) 5-325 MG tablet Take 1 tablet by mouth every 4 hours as needed (Patient not taking: Reported on 7/12/2021) 15 tablet 0     HYDROcodone-acetaminophen (NORCO) 5-325 MG tablet Take 1 tablet by mouth every 4 hours as needed for pain (Patient not taking: Reported on 7/12/2021) 3 tablet 0     naproxen sodium 220 MG capsule Take 220 mg by mouth 2 times daily (with meals) (Patient not taking: Reported on 7/16/2021)       tiZANidine (ZANAFLEX) 2 MG tablet Take 1 tablet (2 mg) by mouth 3 times daily as needed for muscle spasms (Patient not taking: Reported on 7/12/2021) 20 tablet 0       Allergies   Allergen Reactions     No Known Drug Allergy      "    Social History     Tobacco Use     Smoking status: Never     Smokeless tobacco: Never   Vaping Use     Vaping status: Never Used   Substance Use Topics     Alcohol use: Yes     Comment: seldom     History   Drug Use No         Objective     /64   Pulse (!) 47   Temp 98.8  F (37.1  C) (Temporal)   Resp 18   Ht 1.61 m (5' 3.39\")   Wt 66.7 kg (147 lb)   SpO2 98%   BMI 25.72 kg/m      Physical Exam    GENERAL APPEARANCE: healthy, alert and no distress     EYES: EOMI, PERRL     HENT: ear canals and TM's normal and nose and mouth without ulcers or lesions     NECK: no adenopathy, no asymmetry, masses, or scars and thyroid normal to palpation     RESP: lungs clear to auscultation - no rales, rhonchi or wheezes     CV: regular rates and rhythm, normal S1 S2, no S3 or S4 and no murmur, click or rub     MS: extremities normal- no gross deformities noted, no evidence of inflammation in joints, FROM in all extremities.     PSYCH: mentation appears normal. and affect normal/bright     LYMPHATICS: No cervical adenopathy    Recent Labs   Lab Test 07/16/21  1045   HGB 12.7   PLT 98*      POTASSIUM 4.1   CR 1.36*        Diagnostics:  Labs pending at this time.  Results will be reviewed when available.   EKG: atrial fibrillation, rate 47, normal axis, normal intervals, no acute ST/T changes c/w ischemia, no LVH by voltage criteria    Revised Cardiac Risk Index (RCRI):  The patient has the following serious cardiovascular risks for perioperative complications:   - No serious cardiac risks = 0 points     RCRI Interpretation: 0 points: Class I (very low risk - 0.4% complication rate)           Signed Electronically by: Gavino Sauceda PA-C  Copy of this evaluation report is provided to requesting physician.      "

## 2023-05-22 NOTE — TELEPHONE ENCOUNTER
RECORDS RECEIVED FROM: Gavino Sauceda PA-C   DIAGNOSIS:   Atrial fibrillation, unspecified type (H)   Wild-type transthyretin-related (ATTR) amyloidosis      DATE RECEIVED: 05/22/23   NOTES STATUS DETAILS   OFFICE NOTE from referring provider  Internal Referred by Gavino Sauceda PA-C   OFFICE NOTE from other specialist   Care Everywhere Previously seen Dr. Rubio at [facility] 08/15/22   DISCHARGE SUMMARY from hospital  N/A    DISCHARGE REPORT from the ER N/A    OPERATIVE REPORT  N/A    MEDICATION LIST N/A    LABS      Internal    DIAGNOSTIC PROCEDURES     EKG Internal 05/22/23   Mayocardial perfusion  Care Everywhere [facility] 08/27/21   IMAGING (DISC & REPORT)      CT  N/A    MRI N/A    XRAY N/A    ULTRASOUND  Care Everywhere [facility] 08/15/22     Both echo and mayocardial perfusion has been requested from [facility]. Will check PACS later to see if images are available.    NESSA Quiroga   Cardiology Team  [facility]

## 2023-05-22 NOTE — TELEPHONE ENCOUNTER
[facility] Call Center    Phone Message    May a detailed message be left on voicemail: yes     Reason for Call: Other: patient is added on 5/24, thank you      Action Taken: Other: cardiology     Travel Screening: Not Applicable   Thank you!  [facility]

## 2023-05-24 ENCOUNTER — OFFICE VISIT (OUTPATIENT)
Dept: CARDIOLOGY | Facility: CLINIC | Age: 78
End: 2023-05-24
Payer: COMMERCIAL

## 2023-05-24 VITALS
OXYGEN SATURATION: 98 % | DIASTOLIC BLOOD PRESSURE: 76 MMHG | WEIGHT: 146.5 LBS | SYSTOLIC BLOOD PRESSURE: 159 MMHG | BODY MASS INDEX: 25.64 KG/M2 | HEART RATE: 51 BPM

## 2023-05-24 DIAGNOSIS — I48.91 ATRIAL FIBRILLATION, UNSPECIFIED TYPE (H): ICD-10-CM

## 2023-05-24 DIAGNOSIS — E85.82 WILD-TYPE TRANSTHYRETIN-RELATED (ATTR) AMYLOIDOSIS (H): ICD-10-CM

## 2023-05-24 DIAGNOSIS — E78.5 DYSLIPIDEMIA: Primary | ICD-10-CM

## 2023-05-24 PROCEDURE — 99205 OFFICE O/P NEW HI 60 MIN: CPT | Performed by: STUDENT IN AN ORGANIZED HEALTH CARE EDUCATION/TRAINING PROGRAM

## 2023-05-24 NOTE — NURSING NOTE
Charleen Childress's goals for this visit include:   Chief Complaint   Patient presents with     New Patient     Referred by Gavino Sauceda  Chronic Atrial Fibrillation  Cardiac Clearance for Carpal Tunnel surgery        She requests these members of her care team be copied on today's visit information: yes     PCP: Gavino Sauceda    Referring Provider:  Gavino Sauceda PA-C  [address] [place] [id number]    BP (!) 158/82 (BP Location: Left arm, Patient Position: Chair, Cuff Size: Adult Regular)   Pulse 60   Wt 66.5 kg (146 lb 8 oz)   SpO2 98%   BMI 25.64 kg/m      Do you need any medication refills at today's visit? No       NESSA Quiroga   Cardiology Team  [facility]

## 2023-05-24 NOTE — PATIENT INSTRUCTIONS
Take your medicines every day, as directed     Changes made today:  Cardioversion (shocking procedure) for converting your heart rhythm   Follow up with me in three months    Cardiology Care Coordinators:      Belkis NANCE RN     Cardiology Rooming staff:  Sanjeev SZYMANSKI    Phone  [phone]      Fax [phone]    To Contact us     During Business Hours:  [phone]     If you are needing refills please contact your pharmacy.     For urgent after hour care please call the [facility] at [phone] or the [facility] at [phone] and ask to speak to the cardiologist on call.            HOW TO CHECK YOUR BLOOD PRESSURE AT HOME:     Avoid eating, smoking, and exercising for at least 30 minutes before taking a reading.     Be sure you have taken your BP medication at least 2-3 hours before you check it.      Sit quietly for 10 minutes before a reading.      Sit in a chair with your feet flat on the floor. Rest your  arm on a table so that the arm cuff is at the same level as your heart.     Remain still during the reading.  Record your blood pressure and pulse in a log and bring to your next appointment.       Use [employer] allows you to communicate directly with your heart team through secure messaging.  [employer] can be accessed any time on your phone, computer, or tablet.  If you need assistance, we'd be happy to help!             Keep your Heart Appointments:

## 2023-05-24 NOTE — NURSING NOTE
Med Reconcile: Reviewed and verified all current medications with the patient. The updated medication list was printed and given to the patient.    New Medication: Patient was educated regarding newly prescribed medication, including discussion of  the indication, administration, side effects, and when to report to MD or RN. Patient demonstrated understanding of this information and agreed to call with further questions or concerns. Patient was given a free 30 day supply of eliquis that she is currently using. Pt was told that her Co-pay would be around $ 700 therefore patient is looking for cheaper option. Per Dr Mat richards to check if Xarelto or Pradaxa could be cheaper. Staff message sent to pharmacy liaison to look into coverage or if patient could be eligible for rayna assistance. If not patient is aware that coumadin could be the cheapest option if not qualify for further assistance.     Cardioversion procedure was explained to patient. Patient expressed understanding and her questions were answered as able. Patient would like to further discuss with family prior making a decision.     Return Appointment: Patient given instructions regarding scheduling next clinic visit. Patient demonstrated understanding of this information and agreed to call with further questions or concerns. Follow-up in 3 months.     Patient stated she understood all health information given and agreed to call with further questions or concerns.

## 2023-05-25 ENCOUNTER — TELEPHONE (OUTPATIENT)
Dept: CARDIOLOGY | Facility: CLINIC | Age: 78
End: 2023-05-25
Payer: COMMERCIAL

## 2023-05-25 NOTE — TELEPHONE ENCOUNTER
Writer received call from [facility] and was informed by Yolanda that they needed the patient's chart notes from visit to be faxed over ASA as patient's surgery is on 05/26/23. Informed Yolanda that chart notes will be faxed over and ATTN to Yolanda.    NESSA Quiroga   Cardiology Team  [facility]

## 2023-05-26 NOTE — TELEPHONE ENCOUNTER
Appointment noted, closing encounter.    NESSA Quiroga   Cardiology Team  [facility]

## 2023-05-31 DIAGNOSIS — I48.91 ATRIAL FIBRILLATION, UNSPECIFIED TYPE (H): ICD-10-CM

## 2023-05-31 NOTE — TELEPHONE ENCOUNTER
RN was notified by pharmacy that patient   << has a Medicare Part D plan through [facility] and has a deductible of $505 that needs to be met. Once, it's met the Eliquis will be $35 for a 30 day supply.>>     Patient notified of the above, pt thinks it is manageable and agreeable with the plan. Patient is aware not to take any NSAIDS with the eliquis due to increase risk of bleeding.

## 2023-05-31 NOTE — TELEPHONE ENCOUNTER
Both Echo and Mayocardial Perfusion are available in PACS to review.    NESSA Quiroga   Cardiology Team  [facility]

## 2023-06-01 DIAGNOSIS — I48.91 ATRIAL FIBRILLATION, UNSPECIFIED TYPE (H): ICD-10-CM

## 2023-06-02 ENCOUNTER — LAB (OUTPATIENT)
Dept: LAB | Facility: CLINIC | Age: 78
End: 2023-06-02
Payer: COMMERCIAL

## 2023-06-02 DIAGNOSIS — E78.5 DYSLIPIDEMIA: ICD-10-CM

## 2023-06-02 LAB
CHOLEST SERPL-MCNC: 181 MG/DL
HDLC SERPL-MCNC: 56 MG/DL
LDLC SERPL CALC-MCNC: 111 MG/DL
NONHDLC SERPL-MCNC: 125 MG/DL
TRIGL SERPL-MCNC: 71 MG/DL

## 2023-06-02 PROCEDURE — 80061 LIPID PANEL: CPT

## 2023-06-02 PROCEDURE — 36415 COLL VENOUS BLD VENIPUNCTURE: CPT

## 2023-06-05 NOTE — RESULT ENCOUNTER NOTE
Cardiology     Lipid labs reviewed.   I will discuss them with the patient when I see her at the next visit.       Benitez Rivero [facility], [place]    Cardiovascular Division  [facility]

## 2023-06-07 ENCOUNTER — HOSPITAL ENCOUNTER (OUTPATIENT)
Facility: CLINIC | Age: 78
Discharge: STILL ADMITTED OR ADMINISTRATIVE | End: 2023-06-08
Attending: EMERGENCY MEDICINE | Admitting: EMERGENCY MEDICINE
Payer: MEDICARE

## 2023-06-07 DIAGNOSIS — K22.2 ESOPHAGEAL STRICTURE: ICD-10-CM

## 2023-06-07 DIAGNOSIS — T18.108A IMPACTED FOREIGN BODY IN ESOPHAGUS, INITIAL ENCOUNTER: Primary | ICD-10-CM

## 2023-06-07 PROCEDURE — 99285 EMERGENCY DEPT VISIT HI MDM: CPT

## 2023-06-07 PROCEDURE — 99285 EMERGENCY DEPT VISIT HI MDM: CPT | Mod: 25 | Performed by: EMERGENCY MEDICINE

## 2023-06-08 ENCOUNTER — TELEPHONE (OUTPATIENT)
Dept: SURGERY | Facility: CLINIC | Age: 78
End: 2023-06-08

## 2023-06-08 ENCOUNTER — ANESTHESIA EVENT (OUTPATIENT)
Dept: GASTROENTEROLOGY | Facility: CLINIC | Age: 78
End: 2023-06-08
Payer: MEDICARE

## 2023-06-08 ENCOUNTER — ANESTHESIA (OUTPATIENT)
Dept: GASTROENTEROLOGY | Facility: CLINIC | Age: 78
End: 2023-06-08
Payer: MEDICARE

## 2023-06-08 VITALS
TEMPERATURE: 97.1 F | SYSTOLIC BLOOD PRESSURE: 161 MMHG | OXYGEN SATURATION: 100 % | HEART RATE: 46 BPM | RESPIRATION RATE: 15 BRPM | DIASTOLIC BLOOD PRESSURE: 75 MMHG

## 2023-06-08 LAB — UPPER GI ENDOSCOPY: NORMAL

## 2023-06-08 PROCEDURE — 370N000017 HC ANESTHESIA TECHNICAL FEE, PER MIN: Performed by: SURGERY

## 2023-06-08 PROCEDURE — 250N000011 HC RX IP 250 OP 636: Performed by: NURSE ANESTHETIST, CERTIFIED REGISTERED

## 2023-06-08 PROCEDURE — 99214 OFFICE O/P EST MOD 30 MIN: CPT | Mod: 25 | Performed by: SURGERY

## 2023-06-08 PROCEDURE — 43247 EGD REMOVE FOREIGN BODY: CPT | Performed by: SURGERY

## 2023-06-08 PROCEDURE — 258N000003 HC RX IP 258 OP 636: Performed by: NURSE ANESTHETIST, CERTIFIED REGISTERED

## 2023-06-08 PROCEDURE — 250N000009 HC RX 250: Performed by: NURSE ANESTHETIST, CERTIFIED REGISTERED

## 2023-06-08 PROCEDURE — 250N000025 HC SEVOFLURANE, PER MIN: Performed by: SURGERY

## 2023-06-08 RX ORDER — ONDANSETRON 2 MG/ML
4 INJECTION INTRAMUSCULAR; INTRAVENOUS EVERY 30 MIN PRN
Status: DISCONTINUED | OUTPATIENT
Start: 2023-06-08 | End: 2023-06-08 | Stop reason: HOSPADM

## 2023-06-08 RX ORDER — NALOXONE HYDROCHLORIDE 0.4 MG/ML
0.2 INJECTION, SOLUTION INTRAMUSCULAR; INTRAVENOUS; SUBCUTANEOUS
Status: DISCONTINUED | OUTPATIENT
Start: 2023-06-08 | End: 2023-06-08 | Stop reason: HOSPADM

## 2023-06-08 RX ORDER — FENTANYL CITRATE 50 UG/ML
50 INJECTION, SOLUTION INTRAMUSCULAR; INTRAVENOUS EVERY 5 MIN PRN
Status: DISCONTINUED | OUTPATIENT
Start: 2023-06-08 | End: 2023-06-08 | Stop reason: HOSPADM

## 2023-06-08 RX ORDER — HYDROMORPHONE HYDROCHLORIDE 1 MG/ML
0.4 INJECTION, SOLUTION INTRAMUSCULAR; INTRAVENOUS; SUBCUTANEOUS EVERY 5 MIN PRN
Status: DISCONTINUED | OUTPATIENT
Start: 2023-06-08 | End: 2023-06-08 | Stop reason: HOSPADM

## 2023-06-08 RX ORDER — ONDANSETRON 2 MG/ML
4 INJECTION INTRAMUSCULAR; INTRAVENOUS EVERY 6 HOURS PRN
Status: DISCONTINUED | OUTPATIENT
Start: 2023-06-08 | End: 2023-06-08 | Stop reason: HOSPADM

## 2023-06-08 RX ORDER — HYDROMORPHONE HYDROCHLORIDE 1 MG/ML
0.2 INJECTION, SOLUTION INTRAMUSCULAR; INTRAVENOUS; SUBCUTANEOUS EVERY 5 MIN PRN
Status: DISCONTINUED | OUTPATIENT
Start: 2023-06-08 | End: 2023-06-08 | Stop reason: HOSPADM

## 2023-06-08 RX ORDER — OMEPRAZOLE 40 MG/1
40 CAPSULE, DELAYED RELEASE ORAL
Qty: 60 CAPSULE | Refills: 3 | Status: SHIPPED | OUTPATIENT
Start: 2023-06-08 | End: 2023-06-20

## 2023-06-08 RX ORDER — ONDANSETRON 4 MG/1
4 TABLET, ORALLY DISINTEGRATING ORAL EVERY 6 HOURS PRN
Status: DISCONTINUED | OUTPATIENT
Start: 2023-06-08 | End: 2023-06-08 | Stop reason: HOSPADM

## 2023-06-08 RX ORDER — PROCHLORPERAZINE MALEATE 5 MG
5 TABLET ORAL EVERY 6 HOURS PRN
Status: DISCONTINUED | OUTPATIENT
Start: 2023-06-08 | End: 2023-06-08 | Stop reason: HOSPADM

## 2023-06-08 RX ORDER — ONDANSETRON 2 MG/ML
INJECTION INTRAMUSCULAR; INTRAVENOUS PRN
Status: DISCONTINUED | OUTPATIENT
Start: 2023-06-08 | End: 2023-06-08

## 2023-06-08 RX ORDER — METOPROLOL TARTRATE 1 MG/ML
1-2 INJECTION, SOLUTION INTRAVENOUS EVERY 5 MIN PRN
Status: DISCONTINUED | OUTPATIENT
Start: 2023-06-08 | End: 2023-06-08 | Stop reason: HOSPADM

## 2023-06-08 RX ORDER — HYDROXYZINE HYDROCHLORIDE 10 MG/1
10 TABLET, FILM COATED ORAL EVERY 6 HOURS PRN
Status: DISCONTINUED | OUTPATIENT
Start: 2023-06-08 | End: 2023-06-08 | Stop reason: HOSPADM

## 2023-06-08 RX ORDER — NALOXONE HYDROCHLORIDE 0.4 MG/ML
0.4 INJECTION, SOLUTION INTRAMUSCULAR; INTRAVENOUS; SUBCUTANEOUS
Status: DISCONTINUED | OUTPATIENT
Start: 2023-06-08 | End: 2023-06-08 | Stop reason: HOSPADM

## 2023-06-08 RX ORDER — PROPOFOL 10 MG/ML
INJECTION, EMULSION INTRAVENOUS PRN
Status: DISCONTINUED | OUTPATIENT
Start: 2023-06-08 | End: 2023-06-08

## 2023-06-08 RX ORDER — FENTANYL CITRATE 50 UG/ML
25 INJECTION, SOLUTION INTRAMUSCULAR; INTRAVENOUS EVERY 5 MIN PRN
Status: DISCONTINUED | OUTPATIENT
Start: 2023-06-08 | End: 2023-06-08 | Stop reason: HOSPADM

## 2023-06-08 RX ORDER — DIMENHYDRINATE 50 MG/ML
25 INJECTION, SOLUTION INTRAMUSCULAR; INTRAVENOUS
Status: DISCONTINUED | OUTPATIENT
Start: 2023-06-08 | End: 2023-06-08 | Stop reason: HOSPADM

## 2023-06-08 RX ORDER — SODIUM CHLORIDE, SODIUM LACTATE, POTASSIUM CHLORIDE, CALCIUM CHLORIDE 600; 310; 30; 20 MG/100ML; MG/100ML; MG/100ML; MG/100ML
INJECTION, SOLUTION INTRAVENOUS CONTINUOUS PRN
Status: DISCONTINUED | OUTPATIENT
Start: 2023-06-08 | End: 2023-06-08

## 2023-06-08 RX ORDER — FENTANYL CITRATE 50 UG/ML
INJECTION, SOLUTION INTRAMUSCULAR; INTRAVENOUS PRN
Status: DISCONTINUED | OUTPATIENT
Start: 2023-06-08 | End: 2023-06-08

## 2023-06-08 RX ORDER — MEPERIDINE HYDROCHLORIDE 25 MG/ML
12.5 INJECTION INTRAMUSCULAR; INTRAVENOUS; SUBCUTANEOUS EVERY 5 MIN PRN
Status: DISCONTINUED | OUTPATIENT
Start: 2023-06-08 | End: 2023-06-08 | Stop reason: HOSPADM

## 2023-06-08 RX ORDER — FLUMAZENIL 0.1 MG/ML
0.2 INJECTION, SOLUTION INTRAVENOUS
Status: DISCONTINUED | OUTPATIENT
Start: 2023-06-08 | End: 2023-06-08 | Stop reason: HOSPADM

## 2023-06-08 RX ORDER — ONDANSETRON 4 MG/1
4 TABLET, ORALLY DISINTEGRATING ORAL EVERY 30 MIN PRN
Status: DISCONTINUED | OUTPATIENT
Start: 2023-06-08 | End: 2023-06-08 | Stop reason: HOSPADM

## 2023-06-08 RX ORDER — FENTANYL CITRATE 50 UG/ML
50 INJECTION, SOLUTION INTRAMUSCULAR; INTRAVENOUS
Status: DISCONTINUED | OUTPATIENT
Start: 2023-06-08 | End: 2023-06-08 | Stop reason: HOSPADM

## 2023-06-08 RX ORDER — SODIUM CHLORIDE, SODIUM LACTATE, POTASSIUM CHLORIDE, CALCIUM CHLORIDE 600; 310; 30; 20 MG/100ML; MG/100ML; MG/100ML; MG/100ML
INJECTION, SOLUTION INTRAVENOUS CONTINUOUS
Status: DISCONTINUED | OUTPATIENT
Start: 2023-06-08 | End: 2023-06-08 | Stop reason: HOSPADM

## 2023-06-08 RX ORDER — OXYCODONE HYDROCHLORIDE 5 MG/1
5 TABLET ORAL EVERY 4 HOURS PRN
Status: DISCONTINUED | OUTPATIENT
Start: 2023-06-08 | End: 2023-06-08 | Stop reason: HOSPADM

## 2023-06-08 RX ORDER — ACETAMINOPHEN 325 MG/1
975 TABLET ORAL ONCE
Status: DISCONTINUED | OUTPATIENT
Start: 2023-06-08 | End: 2023-06-08 | Stop reason: HOSPADM

## 2023-06-08 RX ORDER — LIDOCAINE HYDROCHLORIDE 10 MG/ML
INJECTION, SOLUTION INFILTRATION; PERINEURAL PRN
Status: DISCONTINUED | OUTPATIENT
Start: 2023-06-08 | End: 2023-06-08

## 2023-06-08 RX ORDER — OXYCODONE HYDROCHLORIDE 5 MG/1
10 TABLET ORAL EVERY 4 HOURS PRN
Status: DISCONTINUED | OUTPATIENT
Start: 2023-06-08 | End: 2023-06-08 | Stop reason: HOSPADM

## 2023-06-08 RX ADMIN — FENTANYL CITRATE 50 MCG: 50 INJECTION, SOLUTION INTRAMUSCULAR; INTRAVENOUS at 02:25

## 2023-06-08 RX ADMIN — PROPOFOL 150 MG: 10 INJECTION, EMULSION INTRAVENOUS at 02:29

## 2023-06-08 RX ADMIN — ROCURONIUM BROMIDE 5 MG: 50 INJECTION, SOLUTION INTRAVENOUS at 02:29

## 2023-06-08 RX ADMIN — SUGAMMADEX 100 MG: 100 INJECTION, SOLUTION INTRAVENOUS at 02:53

## 2023-06-08 RX ADMIN — Medication 100 MG: at 02:27

## 2023-06-08 RX ADMIN — SODIUM CHLORIDE, POTASSIUM CHLORIDE, SODIUM LACTATE AND CALCIUM CHLORIDE: 600; 310; 30; 20 INJECTION, SOLUTION INTRAVENOUS at 02:27

## 2023-06-08 RX ADMIN — ONDANSETRON 4 MG: 2 INJECTION INTRAMUSCULAR; INTRAVENOUS at 02:40

## 2023-06-08 RX ADMIN — LIDOCAINE HYDROCHLORIDE 40 MG: 10 INJECTION, SOLUTION INFILTRATION; PERINEURAL at 02:29

## 2023-06-08 RX ADMIN — FENTANYL CITRATE 50 MCG: 50 INJECTION, SOLUTION INTRAMUSCULAR; INTRAVENOUS at 02:27

## 2023-06-08 ASSESSMENT — ACTIVITIES OF DAILY LIVING (ADL)
ADLS_ACUITY_SCORE: 35
ADLS_ACUITY_SCORE: 35

## 2023-06-08 NOTE — ANESTHESIA POSTPROCEDURE EVALUATION
Patient: Charleen Childress    Procedure: Procedure(s):  ESOPHAGOGASTRODUODENOSCOPY, WITH FOREIGN BODY REMOVAL       Anesthesia Type:  General    Note:  Disposition: Outpatient   Postop Pain Control: Uneventful            Sign Out: Well controlled pain   PONV: No   Neuro/Psych: Uneventful            Sign Out: Acceptable/Baseline neuro status   Airway/Respiratory: Uneventful            Sign Out: Acceptable/Baseline resp. status   CV/Hemodynamics: Uneventful            Sign Out: Acceptable CV status   Other NRE: NONE   DID A NON-ROUTINE EVENT OCCUR? No    Event details/Postop Comments:  Pt was happy with anesthesia care.  No complications.  I will follow up with the pt if needed.           Last vitals:  Vitals Value Taken Time   /79 06/08/23 0315   Temp 97.1  F (36.2  C) 06/08/23 0310   Pulse 46 06/08/23 0317   Resp 15 06/08/23 0317   SpO2 100 % 06/08/23 0317   Vitals shown include unvalidated device data.    Electronically Signed By: ANAYELI Flaherty CRNA  June 8, 2023  3:21 AM

## 2023-06-08 NOTE — TELEPHONE ENCOUNTER
Type of surgery: ESOPHAGOGASTRODUODENOSCOPY  Location of surgery: [facility] [place]  Date and time of surgery: 6/8  Surgeon: Araceli   Pre-Op Appt Date: emergent   Post-Op Appt Date: will call    Packet sent out: Not Applicable  Pre-cert/Authorization completed:  No  Date:

## 2023-06-08 NOTE — DISCHARGE INSTRUCTIONS
[facility]    Home Care Following Endoscopy          Activity:  You have just undergone an endoscopic procedure usually performed with conscious sedation.  Do not work or operate machinery (including a car) for at least 12 hours.        Diet:  Soft diet until seen by GI .  Drink plenty of water.  Resume any regular medications unless otherwise advised by your physician.  Please begin any new medication prescribed as a result of your procedure as directed by your physician.      OMEPRAZOLE prescription sent to [facility]    Pain:  You may take Tylenol as needed for pain.  Expected Recovery:  You can expect some mild abdominal fullness and/or discomfort due to the air used to inflate your intestinal tract. It is also normal to have a mild sore throat after upper endoscopy.    Call Your Physician if You Have:  After Upper Endoscopy:  Shoulder, back or chest pain.  Difficulty breathing or swallowing.  Vomiting blood.      Any questions or concerns about your recovery, please call [phone] or after hours [facility] ([phone]) Nurse Advice Line.        Call [phone].

## 2023-06-08 NOTE — ANESTHESIA CARE TRANSFER NOTE
Patient: Charleen Childress    Procedure: Procedure(s):  ESOPHAGOGASTRODUODENOSCOPY, WITH FOREIGN BODY REMOVAL       Diagnosis: Impacted foreign body in esophagus, initial encounter [T18.108A]  Diagnosis Additional Information: No value filed.    Anesthesia Type:   General     Note:    Oropharynx: oropharynx clear of all foreign objects and spontaneously breathing  Level of Consciousness: drowsy  Oxygen Supplementation: face mask    Independent Airway: airway patency satisfactory and stable  Dentition: dentition unchanged  Vital Signs Stable: post-procedure vital signs reviewed and stable  Report to RN Given: handoff report given  Patient transferred to: PACU    Handoff Report: Identifed the Patient, Identified the Reponsible Provider, Reviewed the pertinent medical history, Discussed the surgical course, Reviewed Intra-OP anesthesia mangement and issues during anesthesia, Set expectations for post-procedure period and Allowed opportunity for questions and acknowledgement of understanding      Vitals:  Vitals Value Taken Time   BP     Temp     Pulse 46 06/08/23 0307   Resp 17 06/08/23 0307   SpO2 100 % 06/08/23 0307   Vitals shown include unvalidated device data.    Electronically Signed By: ANAYELI Flaherty CRNA  June 8, 2023  3:08 AM

## 2023-06-08 NOTE — ED PROVIDER NOTES
History     chief complaint  HPI  Patient is a 77-year-old female with a history of rheumatoid arthritis who is presenting with a piece of steak stuck in her throat since 7:30 PM.  She has had this happen before and sometimes gets it to go down by herself otherwise she has gotten an EGD before.  She is not drooling and was able to get some water down but when she takes a big gulp she throws it up.  No other symptoms.    Review of Systems:  All organ systems below were reviewed and are negative unless indicated in the HPI.    Constitutional  Eyes  ENT  Respiratory  Cardiovascular  Gastrointestinal  Genitourinary  Musculoskeletal  Skin  Neuro    Allergies:  Allergies   Allergen Reactions     No Known Drug Allergy        Problem List:    Patient Active Problem List    Diagnosis Date Noted     Rheumatoid arthritis (H) 11/10/2021     Priority: Medium     Carpal tunnel syndrome of right wrist 02/01/2021     Priority: Medium     Added automatically from request for surgery [id number]       CARDIOVASCULAR SCREENING; LDL GOAL LESS THAN 160 10/31/2010     Priority: Medium     Viral warts 11/24/2006     Priority: Medium     Problem list name updated by automated process. Provider to review          Past Medical History:    History reviewed. No pertinent past medical history.    Past Surgical History:    Past Surgical History:   Procedure Laterality Date     ZZC APPENDECTOMY       ZZC LIGATE FALLOPIAN TUBE,POSTPARTUM      Tubal Ligation     ZZC VAGINAL HYSTERECTOMY  1981    Hysterectomy, Vaginal       Family History:    Family History   Problem Relation Age of Onset     Cerebrovascular Disease Mother      Diabetes Mother      Cancer Father        Medications:    albuterol (PROAIR HFA/PROVENTIL HFA/VENTOLIN HFA) 108 (90 Base) MCG/ACT inhaler  apixaban ANTICOAGULANT (ELIQUIS ANTICOAGULANT) 2.5 MG tablet  Chlorpheniramine Maleate (ALLERGY RELIEF PO)  ibuprofen (ADVIL/MOTRIN) 200 MG tablet  spacer (OPTICHAMBER VANDANA) holding  chamber          Physical Exam   BP: (!) 185/75  Pulse: 52  Temp: 97.8  F (36.6  C)  Resp: 20  SpO2: 99 %    Gen: Vital signs reviewed  Eyes: Sclera white, pupils round  ENT: External ears and nares normal  Card: Regular rate and rhythm  Resp: No respiratory distress. Lungs clear to auscultation bilaterally  Abd: Soft, non-distended, non-tender  Extremities: Symmetric distal pulses.  Skin: No rashes  Neuro: Alert, speech normal.     ED Course        Procedures             No results found for this or any previous visit (from the past 24 hour(s)).    Medications - No data to display      Consultations:  Dr. Charles    Social Determinants of Health:  Manages ADLs    Assessments & Plan (with Medical Decision Making)       I have reviewed the nursing notes.    I have reviewed the findings, diagnosis, plan and need for follow up with the patient.      Medical Decision Making  On arrival, patient well-appearing.  She is hypertensive.  Attempted oral intake here and she just vomits it out.  Given the length of time that this has been going on since the ingestion, I did speak with our general surgeon on-call.  Patient will go to the OR for removal of her esophageal foreign body.    Final diagnoses:   Impacted foreign body in esophagus, initial encounter         Pierce Izquierdo M.D.   [facility] Emergency Department     Pierce Izquierdo MD  06/08/23 0133

## 2023-06-08 NOTE — ANESTHESIA PROCEDURE NOTES
Airway       Patient location during procedure: OR       Procedure Start/Stop Times: 6/8/2023 2:31 AM  Staff -        CRNA: Nidia Dillard APRN CRNA       Performed By: CRNA  Consent for Airway        Urgency: elective  Indications and Patient Condition       Indications for airway management: javy-procedural and airway protection       Induction type:RSI       Mask difficulty assessment: 0 - not attempted    Final Airway Details       Final airway type: endotracheal airway       Successful airway: ETT - single  Endotracheal Airway Details        ETT size (mm): 6.5       Cuffed: yes       Successful intubation technique: direct laryngoscopy and video laryngoscopy       VL Blade Size: Glidescope 4       Grade View of Cords: 1       Adjucts: stylet       Position: Right       Measured from: lips       Bite block used: Oral Airway    Post intubation assessment        Placement verified by: capnometry, equal breath sounds and chest rise        Number of attempts at approach: 1       Number of other approaches attempted: 0       Secured with: plastic tape       Ease of procedure: easy       Dentition: Intact and Unchanged    Medication(s) Administered   Medication Administration Time: 6/8/2023 2:31 AM    Additional Comments       Left side of vocal cords red and edematous prior to intubation.

## 2023-06-08 NOTE — ANESTHESIA PREPROCEDURE EVALUATION
Anesthesia Pre-Procedure Evaluation    Patient: Charleen Childress   MRN: [id number] : 1945        Procedure : Procedure(s):  ESOPHAGOGASTRODUODENOSCOPY          History reviewed. No pertinent past medical history.   Past Surgical History:   Procedure Laterality Date     ZZC APPENDECTOMY       ZZC LIGATE FALLOPIAN TUBE,POSTPARTUM      Tubal Ligation     ZZC VAGINAL HYSTERECTOMY  1981    Hysterectomy, Vaginal      Allergies   Allergen Reactions     No Known Drug Allergy       Social History     Tobacco Use     Smoking status: Never     Smokeless tobacco: Never   Vaping Use     Vaping status: Never Used   Substance Use Topics     Alcohol use: Yes     Comment: seldom      Wt Readings from Last 1 Encounters:   23 66.5 kg (146 lb 8 oz)        Anesthesia Evaluation            ROS/MED HX  ENT/Pulmonary:  - neg pulmonary ROS     Neurologic:  - neg neurologic ROS     Cardiovascular:     (+) -----Previous cardiac testing   Echo: Date: Results:    Stress Test: Date: Results:    ECG Reviewed: Date: 2023 Results:  A-fib rate 47  Cath: Date: Results:      METS/Exercise Tolerance:     Hematologic:       Musculoskeletal:   (+) arthritis,     GI/Hepatic: Comment: H/O food being stuck In esophagus       Renal/Genitourinary:  - neg Renal ROS     Endo:       Psychiatric/Substance Use:  - neg psychiatric ROS     Infectious Disease:  - neg infectious disease ROS     Malignancy:  - neg malignancy ROS     Other:            Physical Exam    Airway  airway exam normal      Mallampati: II   TM distance: > 3 FB   Neck ROM: full   Mouth opening: > 3 cm    Respiratory Devices and Support         Dental       (+) Edentulous      Cardiovascular   cardiovascular exam normal       Rhythm and rate: regular and normal     Pulmonary   pulmonary exam normal        breath sounds clear to auscultation           OUTSIDE LABS:  CBC:   Lab Results   Component Value Date    WBC 5.9 2023    WBC 4.5 2021    HGB 12.8 2023     HGB 12.7 07/16/2021    HCT 40.5 05/22/2023    HCT 39.9 07/16/2021     05/22/2023    PLT 98 (L) 07/16/2021     BMP:   Lab Results   Component Value Date     05/22/2023     07/16/2021    POTASSIUM 4.5 05/22/2023    POTASSIUM 4.1 07/16/2021    CHLORIDE 106 05/22/2023    CHLORIDE 105 07/16/2021    CO2 26 05/22/2023    CO2 27 07/16/2021    BUN 21.0 05/22/2023    BUN 25 07/16/2021    CR 1.04 (H) 05/22/2023    CR 1.36 (H) 07/16/2021    GLC 98 05/22/2023    GLC 97 07/16/2021     COAGS: No results found for: PTT, INR, FIBR  POC: No results found for: BGM, HCG, HCGS  HEPATIC:   Lab Results   Component Value Date    ALBUMIN 3.6 07/16/2021    PROTTOTAL 7.5 07/16/2021    ALT 38 07/16/2021    AST 67 (H) 07/16/2021    ALKPHOS 80 07/16/2021    BILITOTAL 0.4 07/16/2021     OTHER:   Lab Results   Component Value Date    PH 6.0 03/28/2002    ALEM 9.5 05/22/2023    TSH 1.72 01/17/2005    CRP <2.9 11/03/2020    SED 6 11/03/2020       Anesthesia Plan    ASA Status:  3, emergent    NPO Status:  NPO Appropriate    Anesthesia Type: General.     - Airway: ETT   Induction: Intravenous, Propofol.   Maintenance: Balanced.   Techniques and Equipment:     - Airway: Video-Laryngoscope         Consents    Anesthesia Plan(s) and associated risks, benefits, and realistic alternatives discussed. Questions answered and patient/representative(s) expressed understanding.    - Discussed:     - Discussed with:  Patient      - Extended Intubation/Ventilatory Support Discussed: No.      - Patient is DNR/DNI Status: No    Use of blood products discussed: No .     Postoperative Care    Pain management: Oral pain medications, IV analgesics.   PONV prophylaxis: Ondansetron (or other 5HT-3)     Comments:    Other Comments: The risks and benefits of anesthesia, and the alternatives where applicable, have been discussed with the patient, and they wish to proceed.    Patient was scheduled for carpal tunnel surgery may/2023.  The surgery was  cancelled for new onset a-fib.  The patient was placed on xarelto and is scheduled for a cardiac work up.  Dr. Charles has scheduled this surgery as an emergency so we will proceed with a RSI general anesthetic.              ANAYELI Flaherty CRNA

## 2023-06-08 NOTE — CONSULTS
Patient seen in consultation for esophageal food impaction by Pierce Izquierdo MD    HPI:  Patient is a 77 year old female  with complaints of piece of steak that has gotten stuck in her esophagus after dinner tonight, about 7:30 PM.  Feels like if has a small amount of water it may stay down but in the larger volume will come back up.   She normally has issues with solid meats such as steak, pork, chicken so tries to cut these into smaller pieces.  Every 6 to 8 months she will feel like something gets stuck but generally can massage her throat and that will pass.  Has not needed to have scope for relief of impaction previously.  Used to have some heartburn that has been controlled for a long time with watching what she eats.  These issues with food getting stuck as may have been going on for 10 years.  She has not had any EGD in this time, had one maybe 30+ years ago      Review Of Systems    Skin: negative  Ears/Nose/Throat: negative  Respiratory: Since winter had COVID, pneumonia  Cardiovascular: Had recent visit with cardiology for atrial fibrillation recently diagnosed, says they are going to shock it in September  Gastrointestinal: as above  Genitourinary: negative  Musculoskeletal: Carpal tunnel  Neurologic: negative  Hematologic/Lymphatic/Immunologic: negative  Endocrine: negative      History reviewed. No pertinent past medical history.   Patient Active Problem List   Diagnosis     Viral warts     CARDIOVASCULAR SCREENING; LDL GOAL LESS THAN 160     Carpal tunnel syndrome of right wrist     Rheumatoid arthritis (H)         Past Surgical History:   Procedure Laterality Date     ZZC APPENDECTOMY       ZZC LIGATE FALLOPIAN TUBE,POSTPARTUM      Tubal Ligation     ZZC VAGINAL HYSTERECTOMY  1981    Hysterectomy, Vaginal       Social History     Socioeconomic History     Marital status:      Spouse name: Not on file     Number of children: Not on file     Years of education: Not on file     Highest education  level: Not on file   Occupational History     Not on file   Tobacco Use     Smoking status: Never     Smokeless tobacco: Never   Vaping Use     Vaping status: Never Used   Substance and Sexual Activity     Alcohol use: Yes     Comment: seldom     Drug use: No     Sexual activity: Not Currently   Other Topics Concern     Not on file   Social History Narrative     Not on file     Social Determinants of Health     Financial Resource Strain: Not on file   Food Insecurity: Not on file   Transportation Needs: Not on file   Physical Activity: Not on file   Stress: Not on file   Social Connections: Not on file   Intimate Partner Violence: Not on file   Housing Stability: Not on file       Current Outpatient Medications   Medication Sig Dispense Refill     albuterol (PROAIR HFA/PROVENTIL HFA/VENTOLIN HFA) 108 (90 Base) MCG/ACT inhaler INHALE 1 PUFF BY MOUTH 4 TIMES PER DAY AS NEEDED FOR COUGH FOR 7 DAYS       apixaban ANTICOAGULANT (ELIQUIS ANTICOAGULANT) 2.5 MG tablet Take 1 tablet (2.5 mg) by mouth 2 times daily 60 tablet 11     Chlorpheniramine Maleate (ALLERGY RELIEF PO) As needed  (Patient not taking: Reported on 11/9/2021)       ibuprofen (ADVIL/MOTRIN) 200 MG tablet Take 1 tablet (200 mg) by mouth every 4 hours as needed for pain       spacer (OPTICHAMBER VANDANA) holding chamber USE AS DIRECTED WITH INHALER         Medications and history reviewed    Physical exam:  Vitals: BP (!) 168/70   Pulse 54   Temp 97.8  F (36.6  C) (Oral)   Resp 20   SpO2 100%   BMI= There is no height or weight on file to calculate BMI.    Constitutional: healthy, alert and no distress appears to be managing secretions  Head: Normocephalic. No masses, lesions, tenderness or abnormalities  Cardiovascular: positive findings: bradycardia, appears regular  Skin: no suspicious lesions or rashes  Psychiatric: mentation appears normal and affect normal/bright      Assessment:     ICD-10-CM    1. Impacted foreign body in esophagus, initial  encounter  T18.108A Case Request: ESOPHAGOGASTRODUODENOSCOPY     Case Request: ESOPHAGOGASTRODUODENOSCOPY        Plan: Discussed EGD for attempted relief of the food impaction of the esophagus.  Discussed need for general anesthesia for airway protection, possibility of injury to the esophagus such as tear, perforation, bleeding.  If unable to relieve the obstruction may need to be transferred to a facility that has more capabilities.  Patient expressed understanding and consent signed.  Post procedure would recommend soft foods, PPI, and setting up more elective EGD for second look and dilation.    Marcelo Charles MD

## 2023-06-13 ENCOUNTER — TELEPHONE (OUTPATIENT)
Dept: GASTROENTEROLOGY | Facility: CLINIC | Age: 78
End: 2023-06-13
Payer: COMMERCIAL

## 2023-06-13 NOTE — TELEPHONE ENCOUNTER
"Endoscopy Scheduling Screen    Have you had a positive Covid test in the last 14 days?  No    Are you active on MyChart?   No    What insurance is in the chart?  Other:  [employer]    Ordering/Referring Provider: EPI    (If ordering provider performs procedure, schedule with ordering provider unless otherwise instructed. )    BMI: Estimated body mass index is 25.64 kg/m  as calculated from the following:    Height as of 5/22/23: 1.61 m (5' 3.39\").    Weight as of 5/24/23: 66.5 kg (146 lb 8 oz).     Sedation Ordered  moderate sedation.   If patient BMI > 50 do not schedule in ASC.    Are you taking any prescription medications for pain?   No    Are you taking methadone or Suboxone?  No    Do you have a history of malignant hyperthermia or adverse reaction to anesthesia?  No    (Females) Are you currently pregnant?   No     Have you been diagnosed or told you have pulmonary hypertension?   No    Do you have an LVAD?  No    Have you been told you have moderate to severe sleep apnea?  No    Have you been told you have COPD, asthma, or any other lung disease?  No    Do you have any heart conditions?  No     Have you ever had or are you awaiting a heart or lung transplant?   No    Have you had a stroke or transient ischemic attack (TIA aka \"mini stroke\" in the last 6 months?   No    Have you been diagnosed with or been told you have cirrhosis of the liver?   No    Are you currently on dialysis?   No    Do you need assistance transferring?   No    BMI: Estimated body mass index is 25.64 kg/m  as calculated from the following:    Height as of 5/22/23: 1.61 m (5' 3.39\").    Weight as of 5/24/23: 66.5 kg (146 lb 8 oz).     Is patients BMI > 40 and scheduling location [facility]?  No    Do you take the medication Phentermine, Ozempic or Wegovy?  No    Do you take the medication Naltrexone?  No    Do you take blood thinners?  Yes     Are you taking Effient/Prasugrel?  No, you must contact your prescribing provider for direction on " holding or bridging with a different medication.    Preferred Pharmacy:      [facility] [place]  [place] - [address]  [place] [address]  Phone: [phone] Fax: [phone]          Prep   Are you currently on dialysis or do you have chronic kidney disease?  No (standard prep)    Do you have a diagnosis of diabetes?  No    Do you have a diagnosis of cystic fibrosis (CF)?  No    On a regular basis do you go 3 -5 days between bowel movements?  No    BMI > 40?  No    Final Scheduling Details   Colonoscopy prep sent?      Procedure scheduled  EGD    Surgeon:  EPI     Date of procedure:  07/28/2023     Schedule PAC:   No    Location  [facility] - [facility]    Sedation   Moderate Sedation    Patient Reminders:    You will receive a call from a Nurse to review instructions and health history.  This assessment must be completed prior to your procedure.  Failure to complete the Nurse assessment may result in the procedure being cancelled.       On the day of your procedure, please designate an adult(s) who can drive you home stay with you for the next 24 hours. The medicines used in the exam will make you sleepy. You will not be able to drive.       You cannot take public transportation, ride share services, or non-medical taxi service without a responsible caregiver.  Medical transport services are allowed with the requirement that a responsible caregiver will receive you at your destination.  We require that drivers and caregivers are confirmed prior to your procedure.

## 2023-06-13 NOTE — TELEPHONE ENCOUNTER
Caller: Charleen   Reason for Reschedule/Cancellation (please be detailed, any staff messages or encounters to note?): Pt realized date work work       Prior to reschedule please review:    Ordering Provider:Marcelo Charles MD    Sedation per order:moderate    Does patient have any ASC Exclusions, please identify?: n      Notes on Cancelled Procedure:    Procedure:Upper Endoscopy [EGD]     Date: 07/28/2023    Location:[facility] [place]; [address], [place] [id number]    Surgeon: Araceli        Rescheduled: Yes    Procedure: Upper Endoscopy [EGD]     Date: 08/11/2023    Location:[facility] [place]; [address], [place] [address]    Surgeon: Araceli    Sedation Level Scheduled  moderate,  Reason for Sedation Level per order    Prep/Instructions updated and sent: y mail                      (2) difficulty swallowing liquids/foods

## 2023-06-15 ENCOUNTER — TELEPHONE (OUTPATIENT)
Dept: FAMILY MEDICINE | Facility: OTHER | Age: 78
End: 2023-06-15
Payer: COMMERCIAL

## 2023-06-15 ENCOUNTER — NURSE TRIAGE (OUTPATIENT)
Dept: NURSING | Facility: CLINIC | Age: 78
End: 2023-06-15
Payer: COMMERCIAL

## 2023-06-15 NOTE — TELEPHONE ENCOUNTER
Patient calling. A week ago on Wednesday, she was seen in the ER for something stuck in her throat. They did an EGD and she was prescribed omeprazole, and it has caused diarrhea. She wants to know if she should continue to take it, as it's causing bowel incontinence. Please contact patient with further instructions. OK to leave a detailed message at [phone].    Routed to MONSE Sauceda PA-C for further direction to patient.    Shannon Schuler RN  [facility]  Mamie 15, 2023, 11:52 AM    Reason for Disposition    [1] Pharmacy calling with prescription question AND [2] triager unable to answer question    Nursing judgment    Additional Information    Medication questions    Negative: [1] Intentional drug overdose AND [2] suicidal thoughts or ideas    Negative: Drug overdose and triager unable to answer question    Negative: Caller requesting a renewal or refill of a medicine patient is currently taking    Negative: Caller requesting information unrelated to medicine    Negative: Caller requesting information about COVID-19 Vaccine    Negative: Caller requesting information about Emergency Contraception    Negative: Caller requesting information about Combined Birth Control Pills    Negative: Caller requesting information about Progestin Birth Control Pills    Negative: Caller requesting information about Post-Op pain or medicines    Negative: Caller requesting a prescription antibiotic (such as Penicillin) for Strep throat and has a positive culture result    Negative: Caller requesting a prescription anti-viral med (such as Tamiflu) and has influenza (flu) symptoms    Negative: Immunization reaction suspected    Negative: Rash while taking a medicine or within 3 days of stopping it    Negative: [1] Asthma and [2] having symptoms of asthma (cough, wheezing, etc.)    Negative: [1] Symptom of illness (e.g., headache, abdominal pain, earache, vomiting) AND [2] more than mild    Negative: Breastfeeding  questions about mother's medicines and diet    Negative: MORE THAN A DOUBLE DOSE of a prescription or over-the-counter (OTC) drug    Negative: [1] DOUBLE DOSE (an extra dose or lesser amount) of prescription drug AND [2] any symptoms (e.g., dizziness, nausea, pain, sleepiness)    Negative: [1] DOUBLE DOSE (an extra dose or lesser amount) of over-the-counter (OTC) drug AND [2] any symptoms (e.g., dizziness, nausea, pain, sleepiness)    Negative: Took another person's prescription drug    Negative: [1] Prescription not at pharmacy AND [2] was prescribed by PCP recently (Exception: triager has access to EMR and prescription is recorded there. Go to Home Care and confirm for pharmacy.)    Negative: Diabetes drug error or overdose (e.g., took wrong type of insulin or took extra dose)    Negative: [1] DOUBLE DOSE (an extra dose or lesser amount) of prescription drug AND [2] NO symptoms (Exception: a double dose of antibiotics)    Negative: Nursing judgment    Negative: Nursing judgment    Negative: Nursing judgment    Protocols used: MEDICATION QUESTION CALL-A-AH, INFORMATION ONLY CALL - NO TRIAGE-A-OH, INFORMATION ONLY CALL-A-AH

## 2023-06-15 NOTE — TELEPHONE ENCOUNTER
Patient needs to reschedule pre-op on 6/20.  Patient is asking if provider can work her in the same week.   Patient would like a callback with providers response.

## 2023-06-15 NOTE — TELEPHONE ENCOUNTER
Called and spoke with patient and advised of providers response.   No further questions at this time.     Bernarda ROMANN, RN  [facility]

## 2023-06-15 NOTE — TELEPHONE ENCOUNTER
Patient can discontinue use of omeprazole.    She can  pepcid over the counter and take this per package instructions until she is able to see Dr. Zepeda on 06/20/23    Gavino Sauceda PA-C on 6/15/2023 at 12:14 PM

## 2023-06-16 NOTE — TELEPHONE ENCOUNTER
PCP is . I have nothing available other that on 6/20. Forward to  to see if he has something sooner      Thank you,    Toñito Zepeda MD    [address] [address]   Ph: [phone]  Fax:[phone]

## 2023-06-20 ENCOUNTER — OFFICE VISIT (OUTPATIENT)
Dept: FAMILY MEDICINE | Facility: OTHER | Age: 78
End: 2023-06-20
Payer: COMMERCIAL

## 2023-06-20 VITALS
HEART RATE: 54 BPM | TEMPERATURE: 98.1 F | SYSTOLIC BLOOD PRESSURE: 122 MMHG | BODY MASS INDEX: 25.25 KG/M2 | HEIGHT: 63 IN | WEIGHT: 142.5 LBS | DIASTOLIC BLOOD PRESSURE: 72 MMHG | RESPIRATION RATE: 16 BRPM | OXYGEN SATURATION: 99 %

## 2023-06-20 DIAGNOSIS — Z01.818 PREOPERATIVE EXAMINATION: Primary | ICD-10-CM

## 2023-06-20 DIAGNOSIS — I48.20 CHRONIC ATRIAL FIBRILLATION (H): ICD-10-CM

## 2023-06-20 DIAGNOSIS — E85.82 WILD-TYPE TRANSTHYRETIN-RELATED (ATTR) AMYLOIDOSIS (H): ICD-10-CM

## 2023-06-20 DIAGNOSIS — M06.9 RHEUMATOID ARTHRITIS, INVOLVING UNSPECIFIED SITE, UNSPECIFIED WHETHER RHEUMATOID FACTOR PRESENT (H): ICD-10-CM

## 2023-06-20 DIAGNOSIS — G56.02 CARPAL TUNNEL SYNDROME OF LEFT WRIST: ICD-10-CM

## 2023-06-20 LAB — HGB BLD-MCNC: 13 G/DL (ref 11.7–15.7)

## 2023-06-20 PROCEDURE — 36415 COLL VENOUS BLD VENIPUNCTURE: CPT | Performed by: STUDENT IN AN ORGANIZED HEALTH CARE EDUCATION/TRAINING PROGRAM

## 2023-06-20 PROCEDURE — 85018 HEMOGLOBIN: CPT | Performed by: STUDENT IN AN ORGANIZED HEALTH CARE EDUCATION/TRAINING PROGRAM

## 2023-06-20 PROCEDURE — 93000 ELECTROCARDIOGRAM COMPLETE: CPT | Performed by: STUDENT IN AN ORGANIZED HEALTH CARE EDUCATION/TRAINING PROGRAM

## 2023-06-20 PROCEDURE — 99214 OFFICE O/P EST MOD 30 MIN: CPT | Mod: 25 | Performed by: STUDENT IN AN ORGANIZED HEALTH CARE EDUCATION/TRAINING PROGRAM

## 2023-06-20 ASSESSMENT — PAIN SCALES - GENERAL: PAINLEVEL: NO PAIN (0)

## 2023-06-20 NOTE — PROGRESS NOTES
[address]  [facility] [address]  Phone: [phone]  Primary Provider: Gavino Sauceda  Pre-op Performing Provider: HALLE GONZALES      PREOPERATIVE EVALUATION:  Today's date: 6/20/2023    Charleen Childress is a 77 year old female who presents for a preoperative evaluation.      6/20/2023    10:45 AM   Additional Questions   Roomed by Monica     Surgical Information:  Surgery/Procedure: left carpal tunnel release  Surgery Location: [facility]  Surgeon: Dr. Restrepo  Surgery Date: 6/29/23  Time of Surgery: unknown at time of pre-op  Where patient plans to recover: At home with family  Fax number for surgical facility: [phone]    Assessment & Plan     The proposed surgical procedure is considered LOW risk.    Preoperative examination  - Hemoglobin  - EKG 12-lead complete w/read - Clinics  Carpal tunnel syndrome of left wrist  Planned procedure as above with orthopedics    Chronic atrial fibrillation (H)  Fairly new diagnosis for her, rate controlled, still irregular, on Eliquis.  She is following up with cardiology.  - Hemoglobin  - EKG 12-lead complete w/read - Clinics    Rheumatoid arthritis, involving unspecified site, unspecified whether rheumatoid factor present (H)  Previously took NSAIDs but no longer is due to DOAC use as above.  Continue with Tylenol as needed           Risks and Recommendations:  The patient has the following additional risks and recommendations for perioperative complications:   - No identified additional risk factors other than previously addressed    Antiplatelet or Anticoagulation Medication Instructions:   - apixaban (Eliquis), edoxaban (Savaysa), rivaroxaban (Xarelto): Bleeding risk is moderate or high for this procedure AND CrCl  (>=) 50 mL/min. HOLD 2 days before surgery.     Additional Medication Instructions:  Patient is on no additional chronic medications    RECOMMENDATION:  APPROVAL GIVEN to  proceed with proposed procedure, without further diagnostic evaluation.    Subjective       HPI related to upcoming procedure: 77-year-old female with bilateral carpal tunnel, plan for carpal tunnel release left side.  New diagnosis of A-fib as of recently on Eliquis.  Currently rate controlled.        6/20/2023    10:39 AM   Preop Questions   1. Have you ever had a heart attack or stroke? No   2. Have you ever had surgery on your heart or blood vessels, such as a stent placement, a coronary artery bypass, or surgery on an artery in your head, neck, heart, or legs? No   3. Do you have chest pain with activity? No   4. Do you have a history of  heart failure? No   5. Do you currently have a cold, bronchitis or symptoms of other infection? No   6. Do you have a cough, shortness of breath, or wheezing? YES - long COVID type symtoms   7. Do you or anyone in your family have previous history of blood clots? YES - in the 1960s after child birth   8. Do you or does anyone in your family have a serious bleeding problem such as prolonged bleeding following surgeries or cuts? No   9. Have you ever had problems with anemia or been told to take iron pills? No   10. Have you had any abnormal blood loss such as black, tarry or bloody stools, or abnormal vaginal bleeding? No   11. Have you ever had a blood transfusion? No   12. Are you willing to have a blood transfusion if it is medically needed before, during, or after your surgery? Yes   13. Have you or any of your relatives ever had problems with anesthesia? No   14. Do you have sleep apnea, excessive snoring or daytime drowsiness? No   15. Do you have any artifical heart valves or other implanted medical devices like a pacemaker, defibrillator, or continuous glucose monitor? No   16. Do you have artificial joints? No   17. Are you allergic to latex? No       Health Care Directive:  Patient has a Health Care Directive on file      Preoperative Review of :   reviewed - no  "record of controlled substances prescribed.          Review of Systems  Constitutional, neuro, ENT, endocrine, pulmonary, cardiac, gastrointestinal, genitourinary, musculoskeletal, integument and psychiatric systems are negative, except as otherwise noted.    Patient Active Problem List    Diagnosis Date Noted     Chronic atrial fibrillation (H) 06/20/2023     Priority: Medium     Carpal tunnel syndrome of left wrist 06/20/2023     Priority: Medium     Wild-type transthyretin-related (ATTR) amyloidosis (H) 06/20/2023     Priority: Medium     Rheumatoid arthritis (H) 11/10/2021     Priority: Medium     Carpal tunnel syndrome of right wrist 02/01/2021     Priority: Medium     Added automatically from request for surgery [id number]       CARDIOVASCULAR SCREENING; LDL GOAL LESS THAN 160 10/31/2010     Priority: Medium     Viral warts 11/24/2006     Priority: Medium     Problem list name updated by automated process. Provider to review        No past medical history on file.  Past Surgical History:   Procedure Laterality Date     ESOPHAGOSCOPY, GASTROSCOPY, DUODENOSCOPY (EGD), COMBINED N/A 6/8/2023    Procedure: ESOPHAGOGASTRODUODENOSCOPY, WITH FOREIGN BODY REMOVAL;  Surgeon: Marcelo Charles MD;  Location:  GI     Z APPENDECTOMY       ZC LIGATE FALLOPIAN TUBE,POSTPARTUM      Tubal Ligation     Z VAGINAL HYSTERECTOMY  1981    Hysterectomy, Vaginal     Current Outpatient Medications   Medication Sig Dispense Refill     apixaban ANTICOAGULANT (ELIQUIS ANTICOAGULANT) 2.5 MG tablet Take 1 tablet (2.5 mg) by mouth 2 times daily 60 tablet 11       Allergies   Allergen Reactions     No Known Drug Allergy         Social History     Tobacco Use     Smoking status: Never     Smokeless tobacco: Never   Vaping Use     Vaping status: Never Used   Substance Use Topics     Alcohol use: Yes     Comment: seldom         Objective     /72   Pulse 54   Temp 98.1  F (36.7  C) (Temporal)   Resp 16   Ht 1.61 m (5' 3.39\")   " Wt 64.6 kg (142 lb 8 oz)   SpO2 99%   BMI 24.93 kg/m      Physical Exam  Vitals and nursing note reviewed.   Constitutional:       General: She is not in acute distress.     Appearance: Normal appearance. She is not ill-appearing, toxic-appearing or diaphoretic.   HENT:      Head: Normocephalic and atraumatic.      Right Ear: Tympanic membrane, ear canal and external ear normal. There is no impacted cerumen.      Left Ear: Tympanic membrane, ear canal and external ear normal. There is no impacted cerumen.      Nose: Nose normal. No congestion or rhinorrhea.      Mouth/Throat:      Mouth: Mucous membranes are moist.      Pharynx: Oropharynx is clear. No oropharyngeal exudate or posterior oropharyngeal erythema.   Eyes:      General: No scleral icterus.        Right eye: No discharge.         Left eye: No discharge.      Extraocular Movements: Extraocular movements intact.      Conjunctiva/sclera: Conjunctivae normal.      Pupils: Pupils are equal, round, and reactive to light.   Cardiovascular:      Rate and Rhythm: Normal rate. Rhythm irregular.      Heart sounds: No murmur heard.     Comments: Bradycardic at times  Pulmonary:      Effort: Pulmonary effort is normal. No respiratory distress.      Breath sounds: Normal breath sounds. No stridor.   Abdominal:      General: There is no distension.      Palpations: Abdomen is soft.      Tenderness: There is no abdominal tenderness.      Hernia: No hernia is present.   Musculoskeletal:         General: Normal range of motion.      Cervical back: Normal range of motion.      Right lower leg: No edema.      Left lower leg: No edema.   Lymphadenopathy:      Cervical: No cervical adenopathy.   Skin:     General: Skin is warm.   Neurological:      Mental Status: She is alert.   Psychiatric:         Mood and Affect: Mood normal.         Behavior: Behavior normal.         Thought Content: Thought content normal.         Judgment: Judgment normal.           Recent Labs   Lab  Test 05/22/23  1048 07/16/21  1045   HGB 12.8 12.7    98*    139   POTASSIUM 4.5 4.1   CR 1.04* 1.36*        Diagnostics:  Labs pending at this time.  Results will be reviewed when available.   EKG today    Revised Cardiac Risk Index (RCRI):  The patient has the following serious cardiovascular risks for perioperative complications:   - No serious cardiac risks = 0 points     RCRI Interpretation: 0 points: Class I (very low risk - 0.4% complication rate)           Signed Electronically by: HALLE GONZALES MD  Copy of this evaluation report is provided to requesting physician.

## 2023-06-20 NOTE — PATIENT INSTRUCTIONS
Avoid NSAID type medications (including ibuprofen, Motrin, Aleve, Advil, Naprosyn, Naproxen etc...) for 7-10 days prior to the procedure.    You are able to take tylenol the day prior to the procedure but not the day of    Hold all herbal over the counter medications (this includes fish oil) the day of the procedure    For prescription medications day of procedure: HOLD eliquis for 48 hours prior to the procedure       Skin washing: Wash the night before and the morning of the procedure with the skin wash solution    Review the directions that the surgery team discussed with you - call the surgery team for their surgery details              Preparing for Your Surgery  Getting started  A nurse will call you to review your health history and instructions. They will give you an arrival time based on your scheduled surgery time. Please be ready to share:  Your doctor's clinic name and phone number  Your medical, surgical and anesthesia history  A list of allergies and sensitivities  A list of medicines, including herbal treatments and over-the-counter drugs  Whether the patient has a legal guardian (ask how to send us the papers in advance)  Please tell us if you're pregnant--or if there's any chance you might be pregnant. Some surgeries may injure a fetus (unborn baby), so they require a pregnancy test. Surgeries that are safe for a fetus don't always need a test, and you can choose whether to have one.   If you have a child who's having surgery, please ask for a copy of Preparing for Your Child's Surgery.    Preparing for surgery  Within 30 days of surgery: Have a pre-op exam (sometimes called an H&P, or History and Physical). This can be done at a clinic or pre-operative center.  If you're having a , you may not need this exam. Talk to your care team.  At your pre-op exam, talk to your care team about all medicines you take. If you need to stop any medicines before surgery, ask when to start taking them  again.  We do this for your safety. Many medicines can make you bleed too much during surgery. Some change how well surgery (anesthesia) drugs work.  Call your insurance company to let them know you're having surgery. (If you don't have insurance, call [phone].)  Call your clinic if there's any change in your health. This includes signs of a cold or flu (sore throat, runny nose, cough, rash, fever). It also includes a scrape or scratch near the surgery site.  If you have questions on the day of surgery, call your hospital or surgery center.  COVID testing  You may need to be tested for COVID-19 before having surgery. If so, your surgical team will give you instructions for scheduling this test, separate from your preoperative history and physical.  Eating and drinking guidelines  For your safety: Unless your surgeon tells you otherwise, follow the guidelines below.  Eat and drink as usual until 8 hours before surgery. After that, no food or milk.  Drink clear liquids until 2 hours before surgery. These are liquids you can see through, like water, Gatorade and Propel Water. You may also have black coffee and tea (no cream or milk).  Nothing by mouth within 2 hours of surgery. This includes gum, candy and breath mints.  If you drink alcohol: Stop drinking it the night before surgery.  If your care team tells you to take medicine on the morning of surgery, it's okay to take it with a sip of water.  Preventing infection  Shower or bathe the night before and morning of your surgery. Follow the instructions your clinic gave you. (If no instructions, use regular soap.)  Don't shave or clip hair near your surgery site. We'll remove the hair if needed.  Don't smoke or vape the morning of surgery. You may chew nicotine gum up to 2 hours before surgery. A nicotine patch is okay.  Note: Some surgeries require you to completely quit smoking and nicotine. Check with your surgeon.  Your care team will make every effort to  keep you safe from infection. We will:  Clean our hands often with soap and water (or an alcohol-based hand rub).  Clean the skin at your surgery site with a special soap that kills germs.  Give you a special gown to keep you warm. (Cold raises the risk of infection.)  Wear special hair covers, masks, gowns and gloves during surgery.  Give antibiotic medicine, if prescribed. Not all surgeries need antibiotics.  What to bring on the day of surgery  Photo ID and insurance card  Copy of your health care directive, if you have one  Glasses and hearing aides (bring cases)  You can't wear contacts during surgery  Inhaler and eye drops, if you use them (tell us about these when you arrive)  CPAP machine or breathing device, if you use them  A few personal items, if spending the night  If you have . . .  A pacemaker, ICD (cardiac defibrillator) or other implant: Bring the ID card.  An implanted stimulator: Bring the remote control.  A legal guardian: Bring a copy of the certified (court-stamped) guardianship papers.  Please remove any jewelry, including body piercings. Leave jewelry and other valuables at home.  If you're going home the day of surgery  You must have a responsible adult drive you home. They should stay with you overnight as well.  If you don't have someone to stay with you, and you aren't safe to go home alone, we may keep you overnight. Insurance often won't pay for this.  After surgery  If it's hard to control your pain or you need more pain medicine, please call your surgeon's office.  Questions?   If you have any questions for your care team, list them here: _________________________________________________________________________________________________________________________________________________________________________ ____________________________________ ____________________________________ ____________________________________  For informational purposes only. Not to replace the advice of your  health care provider. Copyright   ,  [facility]. All rights reserved. Clinically reviewed by Althea Pal MD. [employer] [id number] - REV .    Preparing for Your Surgery  Getting started  A nurse will call you to review your health history and instructions. They will give you an arrival time based on your scheduled surgery time. Please be ready to share:  Your doctor's clinic name and phone number  Your medical, surgical and anesthesia history  A list of allergies and sensitivities  A list of medicines, including herbal treatments and over-the-counter drugs  Whether the patient has a legal guardian (ask how to send us the papers in advance)  Please tell us if you're pregnant--or if there's any chance you might be pregnant. Some surgeries may injure a fetus (unborn baby), so they require a pregnancy test. Surgeries that are safe for a fetus don't always need a test, and you can choose whether to have one.   If you have a child who's having surgery, please ask for a copy of Preparing for Your Child's Surgery.    Preparing for surgery  Within 30 days of surgery: Have a pre-op exam (sometimes called an H&P, or History and Physical). This can be done at a clinic or pre-operative center.  If you're having a , you may not need this exam. Talk to your care team.  At your pre-op exam, talk to your care team about all medicines you take. If you need to stop any medicines before surgery, ask when to start taking them again.  We do this for your safety. Many medicines can make you bleed too much during surgery. Some change how well surgery (anesthesia) drugs work.  Call your insurance company to let them know you're having surgery. (If you don't have insurance, call [phone].)  Call your clinic if there's any change in your health. This includes signs of a cold or flu (sore throat, runny nose, cough, rash, fever). It also includes a scrape or scratch near the surgery site.  If you have  questions on the day of surgery, call your hospital or surgery center.  COVID testing  You may need to be tested for COVID-19 before having surgery. If so, your surgical team will give you instructions for scheduling this test, separate from your preoperative history and physical.  Eating and drinking guidelines  For your safety: Unless your surgeon tells you otherwise, follow the guidelines below.  Eat and drink as usual until 8 hours before surgery. After that, no food or milk.  Drink clear liquids until 2 hours before surgery. These are liquids you can see through, like water, Gatorade and Propel Water. You may also have black coffee and tea (no cream or milk).  Nothing by mouth within 2 hours of surgery. This includes gum, candy and breath mints.  If you drink alcohol: Stop drinking it the night before surgery.  If your care team tells you to take medicine on the morning of surgery, it's okay to take it with a sip of water.  Preventing infection  Shower or bathe the night before and morning of your surgery. Follow the instructions your clinic gave you. (If no instructions, use regular soap.)  Don't shave or clip hair near your surgery site. We'll remove the hair if needed.  Don't smoke or vape the morning of surgery. You may chew nicotine gum up to 2 hours before surgery. A nicotine patch is okay.  Note: Some surgeries require you to completely quit smoking and nicotine. Check with your surgeon.  Your care team will make every effort to keep you safe from infection. We will:  Clean our hands often with soap and water (or an alcohol-based hand rub).  Clean the skin at your surgery site with a special soap that kills germs.  Give you a special gown to keep you warm. (Cold raises the risk of infection.)  Wear special hair covers, masks, gowns and gloves during surgery.  Give antibiotic medicine, if prescribed. Not all surgeries need antibiotics.  What to bring on the day of surgery  Photo ID and insurance  card  Copy of your health care directive, if you have one  Glasses and hearing aides (bring cases)  You can't wear contacts during surgery  Inhaler and eye drops, if you use them (tell us about these when you arrive)  CPAP machine or breathing device, if you use them  A few personal items, if spending the night  If you have . . .  A pacemaker, ICD (cardiac defibrillator) or other implant: Bring the ID card.  An implanted stimulator: Bring the remote control.  A legal guardian: Bring a copy of the certified (court-stamped) guardianship papers.  Please remove any jewelry, including body piercings. Leave jewelry and other valuables at home.  If you're going home the day of surgery  You must have a responsible adult drive you home. They should stay with you overnight as well.  If you don't have someone to stay with you, and you aren't safe to go home alone, we may keep you overnight. Insurance often won't pay for this.  After surgery  If it's hard to control your pain or you need more pain medicine, please call your surgeon's office.  Questions?   If you have any questions for your care team, list them here: _________________________________________________________________________________________________________________________________________________________________________ ____________________________________ ____________________________________ ____________________________________  For informational purposes only. Not to replace the advice of your health care provider. Copyright   2003, 2019 [facility]. All rights reserved. Clinically reviewed by Althea Pal MD. [employer] [id number] - REV 07/21.

## 2023-06-20 NOTE — RESULT ENCOUNTER NOTE
Team - please call patient with results.  They are negative/normal, nothing else needs to be done at this time with these results unless there are questions or concerns    Thank you,    Zoltan Zepeda MD

## 2023-06-21 NOTE — TELEPHONE ENCOUNTER
JR has no 40 min spot avail until 7/6. See if patient is open to another provider to be seen timely. LM for pt to call back.

## 2023-06-21 NOTE — TELEPHONE ENCOUNTER
I do not believe that I have any 40min chunks of time left this week. Patient can use available 40min chunk (hosp f/u, same day or appr req) to schedule pre-op.    Gavino Sauceda PA-C on 6/21/2023 at 1:40 PM

## 2023-06-21 NOTE — TELEPHONE ENCOUNTER
After looking through chart patient already had appt... closing encounter. Left VM stating to disregard.

## 2023-06-29 ENCOUNTER — TELEPHONE (OUTPATIENT)
Dept: FAMILY MEDICINE | Facility: OTHER | Age: 78
End: 2023-06-29
Payer: COMMERCIAL

## 2023-07-21 ENCOUNTER — TRANSFERRED RECORDS (OUTPATIENT)
Dept: HEALTH INFORMATION MANAGEMENT | Facility: CLINIC | Age: 78
End: 2023-07-21
Payer: COMMERCIAL

## 2023-07-27 ENCOUNTER — TELEPHONE (OUTPATIENT)
Dept: CARDIOLOGY | Facility: CLINIC | Age: 78
End: 2023-07-27
Payer: COMMERCIAL

## 2023-07-27 NOTE — TELEPHONE ENCOUNTER
Left Voicemail (1st Attempt) for the patient to call back and schedule the following:    Appointment type: Return Card  Provider: Dr. Rivero  Return date: next available  Specialty phone number: [phone]  Additional appointment(s) needed:   Additonal Notes:     Dr. Rivero is not available, the appointment needs to be rescheduled.    Also sent an appointment reschedule letter.    Sondra R/Procedure    [facility]   Neurology, NeuroSurgery, NeuroPsychology, Pain Management and Cardiology Specialties  Medical/Surgical Adult Specialties

## 2023-07-28 ENCOUNTER — TELEPHONE (OUTPATIENT)
Dept: GASTROENTEROLOGY | Facility: CLINIC | Age: 78
End: 2023-07-28
Payer: COMMERCIAL

## 2023-07-28 NOTE — TELEPHONE ENCOUNTER
Pre assessment completed for upcoming procedure.      Procedure details:    Patient scheduled for Upper endoscopy (EGD) on 8/11/23.     Arrival time: 1115. Procedure time 1200    Pre op exam needed? N/A    Facility location: [facility]; [address], [place] [address]    Sedation type: Conscious sedation     Indication for procedure:     UES stricture, hx food impaction       COVID policy reviewed.    Designated  policy reviewed. Instructed to have someone stay 6 hours post procedure.       Chart review:     Electronic implanted devices? No    Diabetic? No      Medication review:    Anticoagulants? Yes Apixaban (Eliquis). Holding interval of 2 days.    NSAIDS? No    Other medication HOLDING recommendations:  N/A      Prep for procedure:     Bowel prep recommendation: N/A     Prep instructions sent via letter patient states they did receive.     Reviewed procedure prep instructions.     Patient verbalized understanding and had no questions or concerns at this time.        Sabrina Hernandez RN  Endoscopy Procedure Pre Assessment RN  [phone] option 4

## 2023-08-03 ENCOUNTER — THERAPY VISIT (OUTPATIENT)
Dept: OCCUPATIONAL THERAPY | Facility: CLINIC | Age: 78
End: 2023-08-03
Attending: ORTHOPAEDIC SURGERY
Payer: MEDICARE

## 2023-08-03 DIAGNOSIS — G56.01 CARPAL TUNNEL SYNDROME OF RIGHT WRIST: ICD-10-CM

## 2023-08-03 DIAGNOSIS — G56.02 CARPAL TUNNEL SYNDROME OF LEFT WRIST: Primary | ICD-10-CM

## 2023-08-03 PROCEDURE — 97165 OT EVAL LOW COMPLEX 30 MIN: CPT | Mod: GO | Performed by: OCCUPATIONAL THERAPIST

## 2023-08-03 PROCEDURE — 97140 MANUAL THERAPY 1/> REGIONS: CPT | Mod: GO | Performed by: OCCUPATIONAL THERAPIST

## 2023-08-03 PROCEDURE — 97110 THERAPEUTIC EXERCISES: CPT | Mod: GO | Performed by: OCCUPATIONAL THERAPIST

## 2023-08-03 NOTE — PROGRESS NOTES
OCCUPATIONAL THERAPY EVALUATION  Type of Visit: Evaluation    See electronic medical record for Abuse and Falls Screening details.    Subjective      Presenting condition or subjective complaint: Carpal tunnel surgery  Date of onset: 06/29/23 (Right was completed March of 2021.)    Relevant medical history:     Dates & types of surgery: 1982 Hysterectomy, 1964 Hernia, March 2021 R CTR, 6/29/2023 L [id number] diagnostic imaging/testing results:     11  Prior therapy history for the same diagnosis, illness or injury: No      Prior Level of Function  Transfers: Independent  Ambulation: Independent  ADL: Independent  IADL: Driving, Laundry, Meal preparation, Yard work    Living Environment  Social support: Alone   Type of home: House   Stairs to enter the home: Yes 3 Is there a railing: Yes   Ramp: No   Stairs inside the home: Yes 10     Help at home: None  Equipment owned: Walker; Crutches; Grab bars     Employment: No Retired  Hobbies/Interests: Gardening, cooking, baking    Patient goals for therapy: Decrease nerve pain and tenderness    Pain assessment: Pain present     Objective   ADDITIONAL HISTORY:  Right hand dominant  Patient reports symptoms of pain, stiffness/loss of motion, weakness/loss of strength, numbness, and tingling   Transportation: drives  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 She has miniature horses, sheep, and chickens    Functional Outcome Measure:       PAIN:  Pain Level at Rest: 5/10  Pain Level with Use: 8/10    POSTURE: Forward Neck Posture     EDEMA:  mild edema in L wrist      SCAR/WOUND:  Left slightly raised. Both well healed    SENSATION: neuropathy in bilateral hands. Pt reports that she has lost sensation in all digits.      ROM:   Wrist ROM  Left AROM Right AROM    Extension 65 65   Flexion 45 65   Radial Deviation (RD) 10 10   Ulnar Deviation (UD) 22 30   Supination 55 55   Pronation WNL WNL     Hand ROM  From DPC: Left IF 2 cm, MF 2.8 cm, RF 3.0 cm, SF 3.0 cm.  Pain is 8/10 in left  hand when making a fist. Right: can touch to palm just not tight. Thumb opposition: Right 9/10, Left 8/10.     OBSERVATIONS/APPEARANCE: Tightness: Intrinsic tightness  Muscle wasting noted in Left thenars.      STRENGTH:     Measured in pounds 8/3/2023 8/3/2023    Left Right   Trial 1 2 10   Trial 2 5 10   Trial 3 5 12   Average 4 11     Lateral Pinch  Measured in pounds 8/3/2023 8/3/2023    Left Right   Trial 1 8 6     PALPATION: Pt noted tenderness and catching of right RF. Palpable nodule at A1 pulley of right RF.     Assessment & Plan   CLINICAL IMPRESSIONS  Medical Diagnosis: Bilateral CTR,    Treatment Diagnosis: Hand pain, hand stiffness, wrist pain    Impression/Assessment:  Pt presents with pain and limited ROM of Left hand and wrist following CTR, and limited ROM of R hand following CTR in 2021. Pt unable to demonstrate a tight fist. She has difficulty with functional activities such as opening jars, preparing food, and household chores.     Clinical Decision Making (Complexity):  Assessment of Occupational Performance: 3-5 Performance Deficits  Occupational Performance Limitations: dressing, feeding, care of pets, meal preparation and cleanup, shopping, and leisure activities  Clinical Decision Making (Complexity): Low complexity    PLAN OF CARE  Treatment Interventions:  Modalities:  US  Therapeutic Exercise:  AROM, AAROM, PROM, Tendon Gliding, Place and Hold, and Isotonics  Neuromuscular re-education:  Nerve Gliding, Coordination/Dexterity, Sensory re-education, Proprioceptive Training, and Kinesiotaping  Manual Techniques:  Scar mobilization and STM/IASTM    Long Term Goals   OT Goal 1  Goal Identifier: ADL  Goal Description: Pt will decrease pain to 2/10 with activity to allow for gripping pails.  Target Date: 08/31/23      Frequency of Treatment: 2x/week  Duration of Treatment: 4 weeks     Education Assessment: Learner/Method: Patient     Risks and benefits of evaluation/treatment have been  explained.   Patient/Family/caregiver agrees with Plan of Care.     Evaluation Time:    OT Eval, Low Complexity Minutes (60829): 28     Signing Clinician: NARENDRA Priest [facility]                                                                                   OUTPATIENT OCCUPATIONAL THERAPY      PLAN OF TREATMENT FOR OUTPATIENT REHABILITATION   Patient's Last Name, First Name, Charleen Garcia YOB: 1945   Provider's Name   [facility]   Medical Record No.  [id number]     Onset Date: 06/29/23 (Right was completed March of 2021.) Start of Care Date: 08/03/23     Medical Diagnosis:  Bilateral CTR,      OT Treatment Diagnosis:  Hand pain, hand stiffness, wrist pain Plan of Treatment  Frequency/Duration:2x/week/4 weeks    Certification date from 08/03/23   To 08/31/23        See note for plan of treatment details and functional goals     NARENDRA Priest                         I CERTIFY THE NEED FOR THESE SERVICES FURNISHED UNDER        THIS PLAN OF TREATMENT AND WHILE UNDER MY CARE .             Physician Signature               Date    X_____________________________________________________                    Referring Provider:  Eliana Restrepo      Initial Assessment  See Epic Evaluation- 08/03/23

## 2023-08-09 ENCOUNTER — THERAPY VISIT (OUTPATIENT)
Dept: OCCUPATIONAL THERAPY | Facility: CLINIC | Age: 78
End: 2023-08-09
Attending: ORTHOPAEDIC SURGERY
Payer: MEDICARE

## 2023-08-09 DIAGNOSIS — G56.02 CARPAL TUNNEL SYNDROME OF LEFT WRIST: ICD-10-CM

## 2023-08-09 DIAGNOSIS — G56.01 CARPAL TUNNEL SYNDROME OF RIGHT WRIST: Primary | ICD-10-CM

## 2023-08-09 PROCEDURE — 97140 MANUAL THERAPY 1/> REGIONS: CPT | Mod: GO | Performed by: OCCUPATIONAL THERAPIST

## 2023-08-09 PROCEDURE — 97110 THERAPEUTIC EXERCISES: CPT | Mod: GO | Performed by: OCCUPATIONAL THERAPIST

## 2023-08-11 ENCOUNTER — HOSPITAL ENCOUNTER (OUTPATIENT)
Facility: AMBULATORY SURGERY CENTER | Age: 78
Discharge: HOME | End: 2023-08-11
Attending: SURGERY | Admitting: SURGERY
Payer: COMMERCIAL

## 2023-08-11 ENCOUNTER — TELEPHONE (OUTPATIENT)
Dept: CARDIOLOGY | Facility: CLINIC | Age: 78
End: 2023-08-11

## 2023-08-11 VITALS
SYSTOLIC BLOOD PRESSURE: 152 MMHG | OXYGEN SATURATION: 98 % | TEMPERATURE: 98.2 F | DIASTOLIC BLOOD PRESSURE: 85 MMHG | RESPIRATION RATE: 16 BRPM | HEART RATE: 48 BPM

## 2023-08-11 LAB — UPPER GI ENDOSCOPY: NORMAL

## 2023-08-11 PROCEDURE — G8918 PT W/O PREOP ORDER IV AB PRO: HCPCS

## 2023-08-11 PROCEDURE — G8907 PT DOC NO EVENTS ON DISCHARG: HCPCS

## 2023-08-11 PROCEDURE — 43249 ESOPH EGD DILATION <30 MM: CPT

## 2023-08-11 PROCEDURE — 43249 ESOPH EGD DILATION <30 MM: CPT | Performed by: SURGERY

## 2023-08-11 RX ORDER — ONDANSETRON 2 MG/ML
4 INJECTION INTRAMUSCULAR; INTRAVENOUS
Status: DISCONTINUED | OUTPATIENT
Start: 2023-08-11 | End: 2023-08-12 | Stop reason: HOSPADM

## 2023-08-11 RX ORDER — NALOXONE HYDROCHLORIDE 0.4 MG/ML
0.4 INJECTION, SOLUTION INTRAMUSCULAR; INTRAVENOUS; SUBCUTANEOUS
Status: DISCONTINUED | OUTPATIENT
Start: 2023-08-11 | End: 2023-08-12 | Stop reason: HOSPADM

## 2023-08-11 RX ORDER — FENTANYL CITRATE 50 UG/ML
INJECTION, SOLUTION INTRAMUSCULAR; INTRAVENOUS PRN
Status: DISCONTINUED | OUTPATIENT
Start: 2023-08-11 | End: 2023-08-11 | Stop reason: HOSPADM

## 2023-08-11 RX ORDER — FLUMAZENIL 0.1 MG/ML
0.2 INJECTION, SOLUTION INTRAVENOUS
Status: DISCONTINUED | OUTPATIENT
Start: 2023-08-11 | End: 2023-08-12 | Stop reason: HOSPADM

## 2023-08-11 RX ORDER — ONDANSETRON 4 MG/1
4 TABLET, ORALLY DISINTEGRATING ORAL EVERY 6 HOURS PRN
Status: DISCONTINUED | OUTPATIENT
Start: 2023-08-11 | End: 2023-08-12 | Stop reason: HOSPADM

## 2023-08-11 RX ORDER — LIDOCAINE 40 MG/G
CREAM TOPICAL
Status: DISCONTINUED | OUTPATIENT
Start: 2023-08-11 | End: 2023-08-12 | Stop reason: HOSPADM

## 2023-08-11 RX ORDER — PROCHLORPERAZINE MALEATE 5 MG
5 TABLET ORAL EVERY 6 HOURS PRN
Status: DISCONTINUED | OUTPATIENT
Start: 2023-08-11 | End: 2023-08-12 | Stop reason: HOSPADM

## 2023-08-11 RX ORDER — NALOXONE HYDROCHLORIDE 0.4 MG/ML
0.2 INJECTION, SOLUTION INTRAMUSCULAR; INTRAVENOUS; SUBCUTANEOUS
Status: DISCONTINUED | OUTPATIENT
Start: 2023-08-11 | End: 2023-08-12 | Stop reason: HOSPADM

## 2023-08-11 RX ORDER — ONDANSETRON 2 MG/ML
4 INJECTION INTRAMUSCULAR; INTRAVENOUS EVERY 6 HOURS PRN
Status: DISCONTINUED | OUTPATIENT
Start: 2023-08-11 | End: 2023-08-12 | Stop reason: HOSPADM

## 2023-08-11 NOTE — CONFIDENTIAL NOTE
PT had a procedure in the  ASC today. They wanted her cardiologist to know that prior to the procedure her heart rate was in the 30s. After the procedure it was in the 50s.    Urszula Enriquez on 8/11/2023 at 1:07 PM

## 2023-08-11 NOTE — PROGRESS NOTES
Pt's HR running mid 30's on pre-assessment. Pt states that she is asymptomatic. No Dizziness or lightheadedness.  Dr. Charles consulted and a 12 lead EKG ordered. Dr. Charles was comfortable with proceeding.

## 2023-08-11 NOTE — H&P
Pre-Endoscopy History and Physical     Charleen Childress MRN# [id number]   YOB: 1945 Age: 77 year old     Date of Procedure: 8/11/2023  Primary care provider: Gavino Sauceda  Type of Endoscopy: esophagogastroduodenoscopy (upper GI endoscopy)  Reason for Procedure: stricture, history of food impaction  Type of Anesthesia Anticipated: Moderate Sedation    HPI:    Charleen is a 77 year old female who will be undergoing the above procedure.    Proximal esophageal stricture with recent food impaction now for dilation    A history and physical has been performed. The patient's medications and allergies have been reviewed. The risks and benefits of the procedure and the sedation options and risks were discussed with the patient.  All questions were answered and informed consent was obtained.      She denies a personal or family history of anesthesia complications or bleeding disorders.     Allergies   Allergen Reactions    No Known Drug Allergy         Cannot display prior to admission medications because the patient has not been admitted in this contact.     Current Outpatient Medications   Medication    apixaban ANTICOAGULANT (ELIQUIS ANTICOAGULANT) 2.5 MG tablet     Current Facility-Administered Medications   Medication    lidocaine (LMX4) kit    lidocaine 1 % 0.1-1 mL    ondansetron (ZOFRAN) injection 4 mg    sodium chloride (PF) 0.9% PF flush 3 mL    sodium chloride (PF) 0.9% PF flush 3 mL         Patient Active Problem List   Diagnosis    Viral warts    CARDIOVASCULAR SCREENING; LDL GOAL LESS THAN 160    Carpal tunnel syndrome of right wrist    Rheumatoid arthritis (H)    Chronic atrial fibrillation (H)    Carpal tunnel syndrome of left wrist    Wild-type transthyretin-related (ATTR) amyloidosis (H)        Past Medical History:   Diagnosis Date    Chronic atrial fibrillation (H)         Past Surgical History:   Procedure Laterality Date    carpal tunnel surgery Bilateral     ESOPHAGOSCOPY,  "GASTROSCOPY, DUODENOSCOPY (EGD), COMBINED N/A 06/08/2023    Procedure: ESOPHAGOGASTRODUODENOSCOPY, WITH FOREIGN BODY REMOVAL;  Surgeon: Marcelo Charles MD;  Location: [facility]    HERNIA REPAIR      ZZC APPENDECTOMY      ZZC LIGATE FALLOPIAN TUBE,POSTPARTUM      Tubal Ligation    ZZC VAGINAL HYSTERECTOMY  01/01/1981    Hysterectomy, Vaginal       Social History     Tobacco Use    Smoking status: Never    Smokeless tobacco: Never   Substance Use Topics    Alcohol use: Yes     Comment: seldom       Family History   Problem Relation Age of Onset    Cerebrovascular Disease Mother     Diabetes Mother     Cancer Father        REVIEW OF SYSTEMS:     5 point ROS negative except as noted above in HPI, including Gen., Resp., CV, GI &  system review.      PHYSICAL EXAM:   BP (!) 164/90   Pulse (!) 37   Temp 97.1  F (36.2  C) (Temporal)   Resp 16   SpO2 100%  Estimated body mass index is 24.93 kg/m  as calculated from the following:    Height as of 6/20/23: 1.61 m (5' 3.39\").    Weight as of 6/20/23: 64.6 kg (142 lb 8 oz).   GENERAL APPEARANCE: healthy, alert, and no distress  MENTAL STATUS: alert  AIRWAY EXAM: Mallampatti Class II (visualization of the soft palate, fauces, and uvula)  RESP: lungs clear to auscultation - no rales, rhonchi or wheezes  CV: bradycardia      DIAGNOSTICS:    EKG with afib and bradycardia HR ~40      IMPRESSION   ASA Class 3 - Severe systemic disease, but not incapacitating        PLAN:       Plan for esophagogastroduodenoscopy (upper GI endoscopy). We discussed the risks, benefits and alternatives and the patient wished to proceed.    The above has been forwarded to the consulting provider.      Signed Electronically by: Marcelo Charles MD  August 11, 2023    "

## 2023-08-14 ENCOUNTER — THERAPY VISIT (OUTPATIENT)
Dept: OCCUPATIONAL THERAPY | Facility: CLINIC | Age: 78
End: 2023-08-14
Attending: ORTHOPAEDIC SURGERY
Payer: MEDICARE

## 2023-08-14 DIAGNOSIS — G56.02 CARPAL TUNNEL SYNDROME OF LEFT WRIST: ICD-10-CM

## 2023-08-14 DIAGNOSIS — G56.01 CARPAL TUNNEL SYNDROME OF RIGHT WRIST: Primary | ICD-10-CM

## 2023-08-14 PROCEDURE — 97110 THERAPEUTIC EXERCISES: CPT | Mod: GO

## 2023-08-14 PROCEDURE — 97035 APP MDLTY 1+ULTRASOUND EA 15: CPT | Mod: GO

## 2023-08-14 PROCEDURE — 97140 MANUAL THERAPY 1/> REGIONS: CPT | Mod: GO

## 2023-08-16 ENCOUNTER — THERAPY VISIT (OUTPATIENT)
Dept: OCCUPATIONAL THERAPY | Facility: CLINIC | Age: 78
End: 2023-08-16
Attending: ORTHOPAEDIC SURGERY
Payer: MEDICARE

## 2023-08-16 DIAGNOSIS — G56.01 CARPAL TUNNEL SYNDROME OF RIGHT WRIST: Primary | ICD-10-CM

## 2023-08-16 DIAGNOSIS — G56.02 CARPAL TUNNEL SYNDROME OF LEFT WRIST: ICD-10-CM

## 2023-08-16 PROCEDURE — 97112 NEUROMUSCULAR REEDUCATION: CPT | Mod: GO | Performed by: OCCUPATIONAL THERAPIST

## 2023-08-16 PROCEDURE — 97110 THERAPEUTIC EXERCISES: CPT | Mod: GO | Performed by: OCCUPATIONAL THERAPIST

## 2023-08-21 ENCOUNTER — THERAPY VISIT (OUTPATIENT)
Dept: OCCUPATIONAL THERAPY | Facility: CLINIC | Age: 78
End: 2023-08-21
Attending: ORTHOPAEDIC SURGERY
Payer: MEDICARE

## 2023-08-21 DIAGNOSIS — G56.02 CARPAL TUNNEL SYNDROME OF LEFT WRIST: ICD-10-CM

## 2023-08-21 DIAGNOSIS — G56.01 CARPAL TUNNEL SYNDROME OF RIGHT WRIST: Primary | ICD-10-CM

## 2023-08-21 PROCEDURE — 97035 APP MDLTY 1+ULTRASOUND EA 15: CPT | Mod: GO

## 2023-08-21 PROCEDURE — 97140 MANUAL THERAPY 1/> REGIONS: CPT | Mod: GO

## 2023-08-23 ENCOUNTER — THERAPY VISIT (OUTPATIENT)
Dept: OCCUPATIONAL THERAPY | Facility: CLINIC | Age: 78
End: 2023-08-23
Attending: ORTHOPAEDIC SURGERY
Payer: MEDICARE

## 2023-08-23 DIAGNOSIS — G56.02 CARPAL TUNNEL SYNDROME OF LEFT WRIST: ICD-10-CM

## 2023-08-23 DIAGNOSIS — G56.01 CARPAL TUNNEL SYNDROME OF RIGHT WRIST: Primary | ICD-10-CM

## 2023-08-23 PROCEDURE — 97140 MANUAL THERAPY 1/> REGIONS: CPT | Mod: GO | Performed by: OCCUPATIONAL THERAPIST

## 2023-08-23 PROCEDURE — 97110 THERAPEUTIC EXERCISES: CPT | Mod: GO | Performed by: OCCUPATIONAL THERAPIST

## 2023-08-23 PROCEDURE — 97035 APP MDLTY 1+ULTRASOUND EA 15: CPT | Mod: GO | Performed by: OCCUPATIONAL THERAPIST

## 2023-08-30 ENCOUNTER — THERAPY VISIT (OUTPATIENT)
Dept: OCCUPATIONAL THERAPY | Facility: CLINIC | Age: 78
End: 2023-08-30
Attending: ORTHOPAEDIC SURGERY
Payer: MEDICARE

## 2023-08-30 DIAGNOSIS — G56.02 CARPAL TUNNEL SYNDROME OF LEFT WRIST: ICD-10-CM

## 2023-08-30 DIAGNOSIS — G56.01 CARPAL TUNNEL SYNDROME OF RIGHT WRIST: Primary | ICD-10-CM

## 2023-08-30 PROCEDURE — 97035 APP MDLTY 1+ULTRASOUND EA 15: CPT | Mod: GO | Performed by: OCCUPATIONAL THERAPIST

## 2023-08-30 PROCEDURE — 97110 THERAPEUTIC EXERCISES: CPT | Mod: GO | Performed by: OCCUPATIONAL THERAPIST

## 2023-08-30 PROCEDURE — 97140 MANUAL THERAPY 1/> REGIONS: CPT | Mod: GO | Performed by: OCCUPATIONAL THERAPIST

## 2023-09-01 ENCOUNTER — THERAPY VISIT (OUTPATIENT)
Dept: OCCUPATIONAL THERAPY | Facility: CLINIC | Age: 78
End: 2023-09-01
Attending: ORTHOPAEDIC SURGERY
Payer: MEDICARE

## 2023-09-01 DIAGNOSIS — G56.01 CARPAL TUNNEL SYNDROME OF RIGHT WRIST: Primary | ICD-10-CM

## 2023-09-01 DIAGNOSIS — G56.02 CARPAL TUNNEL SYNDROME OF LEFT WRIST: ICD-10-CM

## 2023-09-01 PROCEDURE — 97035 APP MDLTY 1+ULTRASOUND EA 15: CPT | Mod: GO | Performed by: OCCUPATIONAL THERAPIST

## 2023-09-01 PROCEDURE — 97110 THERAPEUTIC EXERCISES: CPT | Mod: GO | Performed by: OCCUPATIONAL THERAPIST

## 2023-09-01 NOTE — PROGRESS NOTES
DISCHARGE  Reason for Discharge: Patient has met all goals.    Discharge Plan: Pt has been seen for 8 visits. She has made improvements with ROM and strength. She does still get pain along her scar in her left hand, especially with overuse. Instructed pt to wear foam padding when she needs to complete weight bearing activity on left hand. At this time she will be discharged from therapy and continue with her HEP. She was given instructions to call with questions.     Referring Provider:  Eliana Restrepo

## 2023-09-11 ENCOUNTER — TELEPHONE (OUTPATIENT)
Dept: CARDIOLOGY | Facility: CLINIC | Age: 78
End: 2023-09-11
Payer: COMMERCIAL

## 2023-09-11 NOTE — TELEPHONE ENCOUNTER
[facility] Call Center    Phone Message    May a detailed message be left on voicemail: yes     Reason for Call: Medication Question or concern regarding medication   Prescription Clarification  Name of Medication: vyndamax  Prescribing Provider: Dr. Ramiro Witt [phone] [facility]    Pharmacy:      [address]        What on the order needs clarification? Pt was recommended to start this medication by above provider however she wants to confirm that is ok with her cardiology team and the rest of the medications she takes. Please return call to discuss. Please return call to discuss.       Action Taken: Other: Cardiology    Travel Screening: Not Applicable    Thank you!  [facility]

## 2023-09-11 NOTE — TELEPHONE ENCOUNTER
Situation       Patient would like to know if ok to take Vyndamax. See clinic notes from  Dr. Ramiro Witt 9/1/2023.         << Benitez Rivero MD Clerge, Ingrid, RN  Caller: Unspecified (Today,  9:42 AM)  Marcel Graff,  I am in support of this, but I recommend that she talk to Dr. Tamayo first since he is our amyloidosis expert. Can you please put a referral in?  Thanks.  Benitez>>

## 2023-10-02 ENCOUNTER — TELEPHONE (OUTPATIENT)
Dept: CARDIOLOGY | Facility: CLINIC | Age: 78
End: 2023-10-02
Payer: COMMERCIAL

## 2023-10-02 ENCOUNTER — PRE VISIT (OUTPATIENT)
Dept: CARDIOLOGY | Facility: CLINIC | Age: 78
End: 2023-10-02
Payer: COMMERCIAL

## 2023-10-02 DIAGNOSIS — E85.82 WILD-TYPE TRANSTHYRETIN-RELATED (ATTR) AMYLOIDOSIS (H): Primary | ICD-10-CM

## 2023-10-02 NOTE — TELEPHONE ENCOUNTER
----- Message from Hui Lantigua RN sent at 10/2/2023 11:07 AM CDT -----  Hello!     Could you please get this pt scheduled for labs prior to her appt with Dr. Tamayo on 10/5?     Thanks!   Hui RODRIGUEZ

## 2023-10-02 NOTE — TELEPHONE ENCOUNTER
10/02 Called patient (1st attempt) left voicemail and provided [phone] for patient to call back and schedule lab appointment prior to return visit with  on 10/05/2023.         Suzette guajardo Procedure   Gastroenterology, Infectious Diseases, Nephrology, Pulmonology and Rheumatology Specialties  [facility]

## 2023-10-03 ENCOUNTER — LAB (OUTPATIENT)
Dept: LAB | Facility: CLINIC | Age: 78
End: 2023-10-03
Payer: COMMERCIAL

## 2023-10-03 DIAGNOSIS — E85.82 WILD-TYPE TRANSTHYRETIN-RELATED (ATTR) AMYLOIDOSIS (H): ICD-10-CM

## 2023-10-03 LAB
ANION GAP SERPL CALCULATED.3IONS-SCNC: 12 MMOL/L (ref 7–15)
BUN SERPL-MCNC: 23.2 MG/DL (ref 8–23)
CALCIUM SERPL-MCNC: 9.3 MG/DL (ref 8.8–10.2)
CHLORIDE SERPL-SCNC: 109 MMOL/L (ref 98–107)
CREAT SERPL-MCNC: 0.99 MG/DL (ref 0.51–0.95)
DEPRECATED HCO3 PLAS-SCNC: 22 MMOL/L (ref 22–29)
EGFRCR SERPLBLD CKD-EPI 2021: 58 ML/MIN/1.73M2
GLUCOSE SERPL-MCNC: 107 MG/DL (ref 70–99)
NT-PROBNP SERPL-MCNC: 2432 PG/ML (ref 0–1800)
POTASSIUM SERPL-SCNC: 4.4 MMOL/L (ref 3.4–5.3)
SODIUM SERPL-SCNC: 143 MMOL/L (ref 135–145)
TROPONIN T SERPL HS-MCNC: 18 NG/L

## 2023-10-03 PROCEDURE — 36415 COLL VENOUS BLD VENIPUNCTURE: CPT

## 2023-10-03 PROCEDURE — 83880 ASSAY OF NATRIURETIC PEPTIDE: CPT | Performed by: INTERNAL MEDICINE

## 2023-10-03 PROCEDURE — 80048 BASIC METABOLIC PNL TOTAL CA: CPT | Performed by: INTERNAL MEDICINE

## 2023-10-03 PROCEDURE — 84484 ASSAY OF TROPONIN QUANT: CPT | Performed by: INTERNAL MEDICINE

## 2023-10-05 ENCOUNTER — OFFICE VISIT (OUTPATIENT)
Dept: CARDIOLOGY | Facility: CLINIC | Age: 78
End: 2023-10-05
Payer: COMMERCIAL

## 2023-10-05 VITALS
SYSTOLIC BLOOD PRESSURE: 167 MMHG | DIASTOLIC BLOOD PRESSURE: 67 MMHG | WEIGHT: 140 LBS | HEART RATE: 40 BPM | BODY MASS INDEX: 23.9 KG/M2 | OXYGEN SATURATION: 100 % | HEIGHT: 64 IN

## 2023-10-05 DIAGNOSIS — I48.91 ATRIAL FIBRILLATION, UNSPECIFIED TYPE (H): ICD-10-CM

## 2023-10-05 DIAGNOSIS — E85.82 WILD-TYPE TRANSTHYRETIN-RELATED (ATTR) AMYLOIDOSIS (H): Primary | ICD-10-CM

## 2023-10-05 PROCEDURE — 99215 OFFICE O/P EST HI 40 MIN: CPT | Performed by: INTERNAL MEDICINE

## 2023-10-05 RX ORDER — ACETAMINOPHEN 500 MG
500-1000 TABLET ORAL DAILY
COMMUNITY
End: 2024-07-18

## 2023-10-05 ASSESSMENT — PAIN SCALES - GENERAL: PAINLEVEL: NO PAIN (0)

## 2023-10-05 NOTE — PROGRESS NOTES
Cardiology Clinic Note    HPI    Dear colleagues,     I had the pleasure of seeing Ms. Charleen Childress in the Cardiology clinic.  As you know, Ms. Charleen Childress is a pleasant 77 year old female with a past medical history of ATTR cardiac amyloidosis.  I first met the patient's October 5, 2023.  She has previously seen my colleague Dr. Michelle Rivero.    Patient initially referred to [facility] cardiology for evaluation of atrial fibrillation.  She had a preoperative evaluation for carpal tunnel release which EKG showed atrial fibrillation, low voltage, incomplete right bundle branch block.  She was started on apixaban at this time and referred to cardiology.  She was initially diagnosed with TTR amyloid in 2021 when she underwent a right carpal tunnel release and pathology at that time was positive for TTR amyloid.  She had a nuclear medicine PYP scan afterwards that was essentially equivocal  This was repeat a few times which then showed positivity in August 2023.    She presents alone.  She has no cardiopulmonary symptoms.  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    PAST MEDICAL HISTORY:  Patient Active Problem List   Diagnosis    Viral warts    CARDIOVASCULAR SCREENING; LDL GOAL LESS THAN 160    Carpal tunnel syndrome of right wrist    Rheumatoid arthritis (H)    Chronic atrial fibrillation (H)    Carpal tunnel syndrome of left wrist    Wild-type transthyretin-related (ATTR) amyloidosis (H)        FAMILY HISTORY:  Family History   Problem Relation Age of Onset    Cerebrovascular Disease Mother     Diabetes Mother     Cancer Father        SOCIAL HISTORY:  Social History     Tobacco Use    Smoking status: Never    Smokeless tobacco: Never   Vaping Use    Vaping Use: Never used   Substance Use Topics    Alcohol use: Yes     Comment: seldom    Drug use: No        CURRENT MEDICATIONS:  Current Outpatient Medications   Medication Sig Dispense Refill    acetaminophen (TYLENOL) 500 MG  "tablet Take 500-1,000 mg by mouth daily      apixaban ANTICOAGULANT (ELIQUIS ANTICOAGULANT) 2.5 MG tablet Take 1 tablet (2.5 mg) by mouth 2 times daily 60 tablet 11       EXAM:  BP (!) 167/67 (BP Location: Right arm, Patient Position: Sitting, Cuff Size: Adult Regular)   Pulse (!) 40   Ht 1.626 m (5' 4\")   Wt 63.5 kg (140 lb)   SpO2 100%   BMI 24.03 kg/m      General: appears comfortable, alert and articulate  Heart: irregularly irregular, no murmur, estimated JVP 8  Lungs: clear, no rales or wheezing  Extremities: no edema  Neurological: normal speech and affect, no gross motor deficits    Weight  Wt Readings from Last 10 Encounters:   10/05/23 63.5 kg (140 lb)   06/20/23 64.6 kg (142 lb 8 oz)   05/24/23 66.5 kg (146 lb 8 oz)   05/22/23 66.7 kg (147 lb)   12/01/21 62.1 kg (137 lb)   11/09/21 61.9 kg (136 lb 6.4 oz)   07/16/21 59.8 kg (131 lb 12.8 oz)   03/26/21 68 kg (149 lb 14.4 oz)   03/04/21 69.5 kg (153 lb 3.2 oz)   01/21/21 70.1 kg (154 lb 8 oz)       Testing/Procedures:  I personally visualized and interpreted:  : Low voltage, atrial fibrillation, incomplete right bundle branch block    Outside results of note:  Outside records from [facility] Everywhere were obtained, and relevant results/notes have been incorporated into HPI.    Nuclear medicine pyrophosphate PYP scan 8/17/2023:  Summary    1. Strongly suggestive of TTR amyloidosis. A semi-quantitative visual score of 2 or 3 or H/CL ratio >1.5.    2. Heart/Contralateral ratio (H/CL) is 1.58.    3. The Visual score is 3 = High cardiac uptake, heart EUGENIE greater than contralateral bone EUGENIE.     Echocardiogram 8/15/2022:  Summary:    * The left ventricle is normal in size. There is mild concentric LVH with normal systolic function and EF of 60 to 65% with no regional wall motion abnormalities..    * The left ventricular diastolic function is indeterminate.     * The left atrium is mildly enlarged.     * The right atrium is mildly " enlarged.     * The aortic valve is trileaflet with no stenosis and mild aortic regurgitation..    * The mitral valve leaflets are structurally normal.     * There is mild mitral regurgitation.     * There is moderate tricuspid regurgitation.     * No pulmonary hypertension, estimated pulmonary arterial systolic pressure is 34 mmHg.     * The IVC is normal in size (< 2.1 cm), > 50% respiratory variance, RA pressure normal at 3 mmHg.     * There is no pericardial effusion visualized.     * No prior study available for comparison.     Assessment and Plan:     In summary this is a very pleasant patient who presents for ATTR cardiac amyloidosis.  Other pertinent history of atrial fibrillation    In support of this diagnosis:  Positive technetium pyrophosphate scan  Echocardiogram showing LVH  Orthopedic conditions, bilateral carpal tunnel, biopsy showing TTR amyloid  Elevated cardiac biomarkers such as troponin and NT proBNP    At this moment, patient appears to be in stage 1, given high sensitivity troponin 18 (cut off 50) and NT proBNP 2432 (cut off 3000).  The respective four-year overall survival estimates were 57, 42, and 18 percent for stage I (both values below cutoff), stage II (one above), and stage III (both above), respectively.  eGFR 58 (cut off 45), has also shown to have prognostic value for staging.    As we know TTR amyloid patients may not respond GDMT the same way, and there may even be adverse events given the coexisting autonomic neuropathy that may be present.  Certainly if the EF is reduced it becomes a risk-benefit discussion regarding guideline directed medical therapy.  A lot of the HFpEF medications, such as an SGLT2i, mineralocorticoid receptor antagonist, Arni, may still have some benefit given the likely prevalence of amyloid in these trials.    We know that patients with TTR amyloid are at high risk for thromboembolism, and the chads vas score should not be used for risk stratification if  patients have atrial fibrillation.  Transesophageal echo should always be performed prior to any chemical or electrical cardioversion, even if patient has been on anticoagulation.      We will refer for genetic testing to ensure this is not hereditary TTR amyloid.  Apply for tafamidis as a TTR stabilizer  Continue apixaban for thromboembolic prophylaxis of atrial fibrillation, though this should be increased to 5mg BID.  Follow-up in 6 months    The patient states understanding and is agreeable with plan.   Feel free to contact myself regarding questions or concerns.  It was a pleasure to see this patient today.    Vineet Tamayo MD   of Medicine  Advanced Heart Failure and Transplant Cardiology    Today's clinic visit entailed:  60 minutes spent on the date of the encounter doing chart review, history and exam, documentation and further activities per the note    The level of medical decision making during this visit was of high complexity.

## 2023-10-05 NOTE — PATIENT INSTRUCTIONS
"Cardiology Providers you saw during your visit:  Dr. Tamayo    Medication changes:  -Increase Eliquis to 5 mg two times per day.   -Start Tafamidis 61 mg daily.     Follow up:  -Follow-up with Dr. Tamayo in 6 months with labs prior.   -Genetics Referral has been placed-they will reach out to you to schedule this appt.       Please call if you have :  1. Weight gain of more than 2 pounds in a day or 5 pounds in a week  2. Increased shortness of breath, swelling or bloating  3. Dizziness, lightheadedness   4. Any questions or concerns.       Follow the American Heart Association Diet and Lifestyle recommendations:  Limit saturated fat, trans fat, sodium, red meat, sweets and sugar-sweetened beverages. If you choose to eat red meat, compare labels and select the leanest cuts available.  Aim for at least 150 minutes of moderate physical activity or 75 minutes of vigorous physical activity - or an equal combination of both - each week.      During business hours: [phone], press option # 1 to schedule or leave a message for your care team      After hours, weekends or holidays: On Call Cardiologist- [phone]   option #4 and ask to speak to the on-call Cardiologist. Inform them you are a CORE/heart failure patient at the [facility].      Heart Failure Support Group  Monday, , 1-2pm  Monday, , 1-2pm  Monday, , 1-2pm        Hui RODRIGUEZ   Cardiology Care Coordinator - Heart Failure/ [facility].O.[facility] Clinic  [facility]   Questions and [phone]   First press #1 for the [facility] and then press #4 for \"To send a message to your care team\"    " Advancement Flap (Single) Text: Given the location of the defect, inherent tension at the surgical site, and the proximity to free margins an advancement flap was deemed most appropriate for wound reconstruction. The risks, benefits, and possible outcomes of this procedure were discussed along with the risks, benefits, and possible outcomes of other options for wound repair (including but not limited to: complex closure, other flaps, grafts, and second intention). The patient verbalized understanding and consent to the outlined procedure. The patient verbalized understanding that intraoperative conditions may necessitate a change in the outlined procedure resulting in modification of the original surgical plan. This decision may be made at the discretion of the surgeon, and is due to factors subject to change or that are difficult to predict preoperatively. Using a sterile surgical marker, an appropriate advancement flap was drawn incorporating the defect and placing the expected incisions within the relaxed skin tension lines where possible. The surgical site and surrounding skin were prepped and draped in sterile fashion.  Standing cones were removed from opposite ends of the defect within the appropriate surgical plane, repositioning as necessary based upon local tension, proximity to free margins/other anatomic structures, and position of the relaxed skin tension lines. The resulting defect was undermined widely and bilaterally in the appropriate surgical plane taking care to preserve local arteries, veins, nerves, and other structures of anatomic importance. This created a sliding flap capable of advancing across the defect to close the wound under minimal tension. Hemostasis was achieved and then the wound was sutured in layered fashion.

## 2023-10-05 NOTE — NURSING NOTE
New Medication:   Increase Eliquis to 5 mg two times per day.   -Start Tafamidis 61 mg daily. Pt was already approved for Tafamidis and will receive for no cost.     Patient was educated regarding newly prescribed medication, including discussion of  the indication, administration, side effects, and when to report to MD or RN. Patient demonstrated understanding of this information and agreed to call with further questions or concerns.    Return Appointment:   -Follow-up with Dr. Tamayo in 6 months with labs prior.   -Genetics Referral has been placed-they will reach out to you to schedule this appt.   Patient given instructions regarding scheduling next clinic visit. Patient demonstrated understanding of this information and agreed to call with further questions or concerns.    Patient stated she understood all health information given and agreed to call with further questions or concerns.     Hui Lantigua RN

## 2023-10-05 NOTE — PROGRESS NOTES
"Charleen Childress's goals for this visit include: New Patient HF / Amyloidosis      She requests these members of her care team be copied on today's visit information: PCP         PCP: Gavino Sauceda         Referring Provider:  No referring provider defined for this encounter.         BP (!) 167/67 (BP Location: Right arm, Patient Position: Sitting, Cuff Size: Adult Regular)   Pulse (!) 40   Ht 1.626 m (5' 4\")   Wt 63.5 kg (140 lb)   SpO2 100%   BMI 24.03 kg/m           Do you need any medication refills at today's visit? No    WILMA Ding   Cardiology Team  [facility]  [phone]    "

## 2023-11-21 ENCOUNTER — VIRTUAL VISIT (OUTPATIENT)
Dept: CARDIOLOGY | Facility: CLINIC | Age: 78
End: 2023-11-21
Attending: GENETIC COUNSELOR, MS
Payer: MEDICARE

## 2023-11-21 VITALS — HEIGHT: 64 IN | WEIGHT: 140 LBS | BODY MASS INDEX: 23.9 KG/M2

## 2023-11-21 DIAGNOSIS — E85.82 WILD-TYPE TRANSTHYRETIN-RELATED (ATTR) AMYLOIDOSIS (H): ICD-10-CM

## 2023-11-21 PROCEDURE — 96040 HC GENETIC COUNSELING, EACH 30 MINUTES: CPT | Mod: TEL,95 | Performed by: GENETIC COUNSELOR, MS

## 2023-11-21 ASSESSMENT — PAIN SCALES - GENERAL: PAINLEVEL: NO PAIN (0)

## 2023-11-21 NOTE — PROGRESS NOTES
"Virtual Visit Details  Type of service:  Telephone Visit   Phone call duration: 45 minutes     PROVIDER APPOINTMENT  Here is a copy of the progress note from your recent genetic counseling visit through the [facility] on Date: 2023.  Patient was seen through a virtual visit.    PROGRESS NOTE:Charleen was referred by Vineet Tamayo MD for genetic counseling due to her  history of transthyretin amyloidosis (TTR).  I had the opportunity to talk with Charleen today to discuss the genetic component of TTR amyloidosis and testing options available to her .     MEDICAL HISTORY: Charleen was diagnosed with TTR amyloid in  after her carpal tunnel diagnosis and surgery.  She was scheduled for her second surgery on left side in  but with COVID and death of her , that was pushed to .  She also has AFib, diagnosed this year.    Since TTR amyloidosis can also impact other organs, we discussed related symptoms.  Patient reports other symptoms consistent with TTR amyloidosis including peripheral neuropathy of her hands, reduced hunger, early satiety, carpal tunnel syndrome, and some increased fatigue.    FAMILY HISTORY:A detailed family history was obtained during today's consult.  Family history was significant for the following cardiac history:  Brother (85) with heart flutter.  He fainted last week and is going to be evaluated.  He also has a history of carpal tunnel.  Brother (86) with heart issues, but no more details known.  2 sisters with macular degeneration.  Sister (89) in good health.  Mother  at 92 years.  She had a stroke at 74 years and had a history of carpal tunnel.  9 maternal aunts and uncles mostly  of old age.  No further details.  Maternal grandmother  56 with breast cancer. Grandfather  in his 90's with \"hardening of the arteries\"  Father  at 76 years with cancer.    7 paternal aunts and uncles are not known to have had heart " issues.  Paternal grandmother  60's from a suspected heart attack. Grandfather  80's from a stroke.  Two sons and a daughter in good health.  Her eldest son has three children, one son with autism.  There is no additional history of cardiomyopathy, arrhythmias, heart attacks, fainting, sudden cardiac death, genetic conditions, or birth defects. (A copy of pedigree may be found under media tab).    DISCUSSION: Amyloidosis is a slowly progressive condition characterized by the buildup of abnormal deposits of a protein called amyloid in the body's organs and tissues. It can be a difficult condition to diagnose and understand because it can appear in different organs and have several causes.  One particular form is transthyretin (TTR) amyloidosis and it can affect different organs.  People with cardiac TTR amyloidosis may have an abnormal heartbeat (arrhythmia), an enlarged heart (cardiomegaly), or sudden and abrupt increase in blood pressure when a person stands up (orthostatic hypertension). These abnormalities can lead to progressive heart failure and death.  Occasionally, people with the cardiac form also have mild loss of sensation in the extremities (peripheral neuropathy), carpal tunnel, and autonomic dysfunction, including involuntary bodily functions.    TTR amyloid can occur as wild type or hereditary.  Wild type means that the TTR gene does NOT have a mutation and build up of the protein occurs with age.  Hereditary amyloidosis is caused by mutations in the TTR gene and can be passed along in families.      Reviewed option for genetic testing to confirm genetic TTR cardiac amyloidosis. Discussed that genetic testing of the TTR gene detects a mutation in greater than 99% of individuals who carry a mutation.      Reviewed autosomal dominant (AD) inheritance pattern associated with TTR mutations, including the 50% risk for recurrence.  Explained that TTR gene mutations can be associated with variable  expressivity, meaning that individuals who carry a gene mutation can have varying symptoms as well as onset and severity. In addition, symptoms are also more common in men, therefore women are less likely to be affected even if they carry the gene mutation.    Reviewed capabilities, limitations, and logistics of testing.  DNA sample via saliva or blood is collected and sent to testing lab for evaluation of selected genes. The results could directly impact care and treatment. Test results take approximately 2-4 weeks on average. The labs have teamed up with companies to help increase knowledge and understanding of amyloidosis and are currently offering testing at no cost to patients.     Explained three possible outcomes of genetic testing including: positive identification of a mutation, no mutation identified, and identification of a variant of unknown significance (VUS). If a mutation is identified, presymptomatic testing would be available to at risk family members. If no mutation is identified, it does not rule out the possibility of a genetic component to this disease. Family members could still be at risk for developing the same condition. If a VUS is identified, it is unclear if the mutation is disease causing or just a normal variation. It may take time and possibly additional testing to determine the meaning of a VUS result.     Discussed pros and cons of genetic testing. Explained that results could determine the cause for amyloidosis and allow for direction in treatment studies and protocols.  If a mutation is identified, presymptomatic testing is available to all at risk relatives. Recommend clinical evaluation for all first degree relatives (parents, siblings, and children) of an affected individual regardless of decision to pursue genetic testing.      All questions answered at this time.    PLAN: Charleen elected to proceed with genetic testing.  Requisition and consent forms were completed and signed.   DNA will be collected via cheek swab sample [facility]. I will contact patient when results are available.    TOTAL TIME SPENT IN COUNSELIN minutes    Sara Pollack MS, [facility]  Licensed, Certified Genetic Counselor  [facility]

## 2023-11-21 NOTE — NURSING NOTE
Is the patient currently in the state of [place]? YES    Visit mode:TELEPHONE    If the visit is dropped, the patient can be reconnected by: TELEPHONE VISIT: Phone number:   Telephone Information:   Mobile [phone]       Will anyone else be joining the visit? NO  (If patient encounters technical issues they should call [phone]    How would you like to obtain your AVS? Mail a copy    Are changes needed to the allergy or medication list?  Pt states also taking Vyndamax 61mg daily (not available to reconcile), Pt NOT taking Tylenol      Reason for visit: Consult    Sharmin Weiner MA [facility]

## 2023-11-21 NOTE — PATIENT INSTRUCTIONS
"Indication for Genetic Counseling:     Hereditary amyloidosis is a slowly progressive condition characterized by the buildup of abnormal deposits of a protein called amyloid (amyloidosis) in the body's organs and tissues.  The majority of hereditary amyloidosis types are related to the protein transthyretin (TTR). Mutations in the TTR gene can lead to three different forms of amyloidosis, characterized by the organs impacted by the disease.  People with cardiac amyloidosis may have an abnormal heartbeat (arrhythmia), an enlarged heart (cardiomegaly), or  sudden and abrupt increase in blood pressure when a person stands up (orthostatic hypertension). These abnormalities can lead to progressive heart failure and death. Occasionally, people with the cardiac form also have mild loss of sensation in the extremities (peripheral neuropathy)..    Inheritance:   Humans have over 20,000 genes that instruct our bodies how to function.  We have two copies of each gene because we inherit one from our mother and one from our father.  In most cardiac cases with a genetic component, the condition is inherited in an autosomal dominant (AD) pattern.  This means that in order to have the condition, a person needs to inherit a mutation on one copy of a particular gene.  This mutation or pathogenic variant dominates the \"normal\" working copy of the gene.  When an affected individual has children, they can either pass on the \"normal\" copy of the gene or the mutation.  Therefore, children have a 50% chance of inheriting the mutation.  Other family members also have an increased risk but the specific risk depends on the degree of relationship.  Additional inheritance patterns can occur within families and may alter the risk of recurrence.     Testing Options:   Genetic testing is available to assess a panel of genes known to cause this condition.  This test reads through the DNA (sequencing) of these genes to look for spelling mistakes or " mutations that could cause the condition.      There are three types of results you could receive from this test.     -Positive result (mutation/pathogenic variant identified) - confirms diagnosis and provides an answer to why this happened.  In addition, identifying a mutation allows family members to have testing to determine their risk.     -Negative result (mutation not identified) - no genetic changes were identified.  This does not rule out a genetic cause for the condition as the genetic testing only identifies 99% of genetic causes for this condition.    -Variant of uncertain significance (VUS) - a genetic change was identified, but there is not enough information to determine whether it is disease-causing or normal human genetic variation.     Although genetic testing may identify a mutation, it cannot provide information about the severity of symptoms or the progression of disease.  We cannot predict age of onset or severity of symptoms due to reduced penetrance and variable expressivity.    Logistics:   Genetic testing involves collecting a sample of DNA, thru blood, saliva, or cheek cells.  The sample will be sent to a laboratory to extract the DNA and sequence the genes for mutations.  The laboratory will work with your insurance company to determine the out of pocket (OOP) cost and will notify you if the OOP cost is greater than $100.  Remember to ask the lab about financial assistance pricing and self pay options as well.  Sometimes those are much lower than insurance pricing.  When testing is initiated, results take about 2-4 weeks to return. I will contact you over the phone when results are available.     Genetic Information and Nondiscrimination Act:  The Genetic Information and Nondiscrimination Act of 2008 (JALEN) is a federal law that protects individuals from genetic discrimination in health insurance and employment. Genetic discrimination is defined as the misuse of genetic information. This  law does not address potential discrimination regarding life insurance or disability insurance.      This is especially relevant for at risk individuals who are considering presymptomatic testing.    Screening Recommendations:  Recommend clinical evaluation for all first degree relatives (parents, siblings, and children) of an affected individual regardless of decision to pursue genetic testing.  Clinical evaluation for cardiac amyloidosis should include history, cardiac exam, ECHO, and EKG.  It may also include heart monitoring, additional imaging, biopsy, and exercise treadmill.    Resources:  Amyloidosis Foundation - amyloidosis.org    General   American Heart Association - americanheart.org  Genetics Home Reference - ghr.nlm.nih.gov  Genetic Information and Nondiscrimination Act - ginahelp.org    Contact Information:  Sara Pollack MS  Licensed Genetic Counselor  [facility]  [facility]    Office:  [phone]  Appointments:  [phone]  Fax: [phone]  Email: [email or web address]

## 2023-11-21 NOTE — LETTER
2023      RE: Charleen Childress  [address]  [place] [address]       Dear Colleague,    Thank you for the opportunity to participate in the care of your patient, Charleen Childress, at the [facility] [place] [facility]. Please see a copy of my visit note below.    Virtual Visit Details  Type of service:  Telephone Visit   Phone call duration: 45 minutes     PROVIDER APPOINTMENT  Here is a copy of the progress note from your recent genetic counseling visit through the [facility] on Date: 2023.  Patient was seen through a virtual visit.    PROGRESS NOTE:Charleen was referred by Vineet Tamayo MD for genetic counseling due to her  history of transthyretin amyloidosis (TTR).  I had the opportunity to talk with Charleen today to discuss the genetic component of TTR amyloidosis and testing options available to her .     MEDICAL HISTORY: Charleen was diagnosed with TTR amyloid in  after her carpal tunnel diagnosis and surgery.  She was scheduled for her second surgery on left side in  but with COVID and death of her , that was pushed to .  She also has AFib, diagnosed this year.    Since TTR amyloidosis can also impact other organs, we discussed related symptoms.  Patient reports other symptoms consistent with TTR amyloidosis including peripheral neuropathy of her hands, reduced hunger, early satiety, carpal tunnel syndrome, and some increased fatigue.    FAMILY HISTORY:A detailed family history was obtained during today's consult.  Family history was significant for the following cardiac history:  Brother (85) with heart flutter.  He fainted last week and is going to be evaluated.  He also has a history of carpal tunnel.  Brother (86) with heart issues, but no more details known.  2 sisters with macular degeneration.  Sister (89) in good health.  Mother  at 92 years.  She had a  "stroke at 74 years and had a history of carpal tunnel.  9 maternal aunts and uncles mostly  of old age.  No further details.  Maternal grandmother  56 with breast cancer. Grandfather  in his 90's with \"hardening of the arteries\"  Father  at 76 years with cancer.    7 paternal aunts and uncles are not known to have had heart issues.  Paternal grandmother  60's from a suspected heart attack. Grandfather  80's from a stroke.  Two sons and a daughter in good health.  Her eldest son has three children, one son with autism.  There is no additional history of cardiomyopathy, arrhythmias, heart attacks, fainting, sudden cardiac death, genetic conditions, or birth defects. (A copy of pedigree may be found under media tab).    DISCUSSION: Amyloidosis is a slowly progressive condition characterized by the buildup of abnormal deposits of a protein called amyloid in the body's organs and tissues. It can be a difficult condition to diagnose and understand because it can appear in different organs and have several causes.  One particular form is transthyretin (TTR) amyloidosis and it can affect different organs.  People with cardiac TTR amyloidosis may have an abnormal heartbeat (arrhythmia), an enlarged heart (cardiomegaly), or sudden and abrupt increase in blood pressure when a person stands up (orthostatic hypertension). These abnormalities can lead to progressive heart failure and death.  Occasionally, people with the cardiac form also have mild loss of sensation in the extremities (peripheral neuropathy), carpal tunnel, and autonomic dysfunction, including involuntary bodily functions.    TTR amyloid can occur as wild type or hereditary.  Wild type means that the TTR gene does NOT have a mutation and build up of the protein occurs with age.  Hereditary amyloidosis is caused by mutations in the TTR gene and can be passed along in families.      Reviewed option for genetic testing to confirm genetic TTR " cardiac amyloidosis. Discussed that genetic testing of the TTR gene detects a mutation in greater than 99% of individuals who carry a mutation.      Reviewed autosomal dominant (AD) inheritance pattern associated with TTR mutations, including the 50% risk for recurrence.  Explained that TTR gene mutations can be associated with variable expressivity, meaning that individuals who carry a gene mutation can have varying symptoms as well as onset and severity. In addition, symptoms are also more common in men, therefore women are less likely to be affected even if they carry the gene mutation.    Reviewed capabilities, limitations, and logistics of testing.  DNA sample via saliva or blood is collected and sent to testing lab for evaluation of selected genes. The results could directly impact care and treatment. Test results take approximately 2-4 weeks on average. The labs have teamed up with companies to help increase knowledge and understanding of amyloidosis and are currently offering testing at no cost to patients.     Explained three possible outcomes of genetic testing including: positive identification of a mutation, no mutation identified, and identification of a variant of unknown significance (VUS). If a mutation is identified, presymptomatic testing would be available to at risk family members. If no mutation is identified, it does not rule out the possibility of a genetic component to this disease. Family members could still be at risk for developing the same condition. If a VUS is identified, it is unclear if the mutation is disease causing or just a normal variation. It may take time and possibly additional testing to determine the meaning of a VUS result.     Discussed pros and cons of genetic testing. Explained that results could determine the cause for amyloidosis and allow for direction in treatment studies and protocols.  If a mutation is identified, presymptomatic testing is available to all at risk  relatives. Recommend clinical evaluation for all first degree relatives (parents, siblings, and children) of an affected individual regardless of decision to pursue genetic testing.      All questions answered at this time.    PLAN: Charleen elected to proceed with genetic testing.  Requisition and consent forms were completed and signed.  DNA will be collected via cheek swab sample and sent to [facility]. I will contact patient when results are available.    TOTAL TIME SPENT IN COUNSELIN minutes    Sara Pollack MS, [facility] [place]  Licensed, Certified Genetic Counselor  [facility]       Please do not hesitate to contact me if you have any questions/concerns.     Sincerely,     Sara Pollack GC

## 2023-12-14 ENCOUNTER — TELEPHONE (OUTPATIENT)
Dept: CARDIOLOGY | Facility: CLINIC | Age: 78
End: 2023-12-14
Payer: COMMERCIAL

## 2023-12-19 NOTE — TELEPHONE ENCOUNTER
Spoke with Charleen today to review results of genetic testing. She underwent genetic testing on December 2, 2023 due to her diagnosis of amyloidosis.     Genetic testing for the TTR amyloidosis gene thru [employer] revealed that Charleen does NOT carry a mutation in the TTR gene. It is predicted that this panel will identify mutations in 99% of hereditary TTR amyloidosis cases.     Therefore, this negative result indicates that her amyloidosis is likely due to wild type amyloidosis and not the hereditary form.  At this point in time, we do not think family members are at increased risk for wild type TTR amyloidosis.  However, they should still pay attention to possible symptoms and be seen if needed.      Explained that with continued research and genetic knowledge may increase over time. Therefore, it may be worth touching base in the years to come to learn about new testing options.   A summary letter and copy of the results will be sent to patient. All questions answered at this time.     Sara Pollack MS, [facility] [place]  Licensed, Certified Genetic Counselor  [facility]  [facility]

## 2024-06-10 ENCOUNTER — PATIENT OUTREACH (OUTPATIENT)
Dept: FAMILY MEDICINE | Facility: OTHER | Age: 79
End: 2024-06-10
Payer: COMMERCIAL

## 2024-06-10 NOTE — TELEPHONE ENCOUNTER
Patient Quality Outreach    Patient is due for the following:   Physical Annual Wellness Visit    Next Steps:   Schedule a Annual Wellness Visit    Type of outreach:    Sent letter.      Questions for provider review:    None           Nahomi Krishna, CMA

## 2024-06-10 NOTE — LETTER
Marcel Villaseñor,     Our records indicate that you are due for a Annual Wellness Check with your Primary Care Provider.     Our records also indicate that you are due for some immunizations, labs and/or preventative screenings.     Please schedule an appointment via [employer] or by calling the clinic at [phone], and asking to speak to a member of your care team.        To Schedule an Appointment via [employer] -     1) Click the Visits tab (located on the top left) and select the option to Schedule an Appointment  2) Choose the department you would like to schedule in and respond to any additional questions that populate.  (Note: There are also options for you to indicate preferred clinic locations and providers)  3) Verify your Personal Information by clicking This Information Is Correct or Edit if the information is not correct  4) Review the insurance information on file and if current, select This Information Is Correct   If the information is not correct, you can select options to remove or update your coverage.  5) Where prompted, please specify the reason for your appointment and select Schedule    After the appointment has been scheduled, you will see a confirmation with all the details for your upcoming visit.      Take Care,  Your [facility] Patient Care Team    [facility]  Phone: [phone]  Fax: [phone]

## 2024-07-09 ENCOUNTER — DOCUMENTATION ONLY (OUTPATIENT)
Dept: CARDIOLOGY | Facility: CLINIC | Age: 79
End: 2024-07-09

## 2024-07-09 NOTE — PROGRESS NOTES
Charleen Childress has an upcoming lab appointment:    Future Appointments   Date Time Provider Department [place]   7/18/2024  3:00 PM LAB FIRST FLOOR [facility]TE [facility]   7/18/2024  3:30 PM [facility]     Patient is scheduled for the following lab(s): Pt is requesting labs for 7/18/24 from [facility] with no orders in chart.    There is no order available. Please review and place either future orders or HMPO (Review of Health Maintenance Protocol Orders), as appropriate.    Health Maintenance Due   Topic    ANNUAL REVIEW OF HM ORDERS     HEPATITIS C SCREENING      Thank you, Jessica Tobin

## 2024-07-11 ENCOUNTER — PRE VISIT (OUTPATIENT)
Dept: CARDIOLOGY | Facility: CLINIC | Age: 79
End: 2024-07-11
Payer: COMMERCIAL

## 2024-07-11 DIAGNOSIS — E85.82 WILD-TYPE TRANSTHYRETIN-RELATED (ATTR) AMYLOIDOSIS (H): Primary | ICD-10-CM

## 2024-07-18 ENCOUNTER — LAB (OUTPATIENT)
Dept: LAB | Facility: CLINIC | Age: 79
End: 2024-07-18
Payer: COMMERCIAL

## 2024-07-18 ENCOUNTER — OFFICE VISIT (OUTPATIENT)
Dept: CARDIOLOGY | Facility: CLINIC | Age: 79
End: 2024-07-18
Attending: INTERNAL MEDICINE
Payer: COMMERCIAL

## 2024-07-18 VITALS
BODY MASS INDEX: 23.77 KG/M2 | DIASTOLIC BLOOD PRESSURE: 68 MMHG | HEART RATE: 46 BPM | SYSTOLIC BLOOD PRESSURE: 143 MMHG | OXYGEN SATURATION: 100 % | WEIGHT: 138.5 LBS

## 2024-07-18 DIAGNOSIS — E85.82 WILD-TYPE TRANSTHYRETIN-RELATED (ATTR) AMYLOIDOSIS (H): ICD-10-CM

## 2024-07-18 LAB
ANION GAP SERPL CALCULATED.3IONS-SCNC: 9 MMOL/L (ref 7–15)
BUN SERPL-MCNC: 17.6 MG/DL (ref 8–23)
CALCIUM SERPL-MCNC: 9.9 MG/DL (ref 8.8–10.4)
CHLORIDE SERPL-SCNC: 108 MMOL/L (ref 98–107)
CREAT SERPL-MCNC: 1.22 MG/DL (ref 0.51–0.95)
EGFRCR SERPLBLD CKD-EPI 2021: 45 ML/MIN/1.73M2
GLUCOSE SERPL-MCNC: 88 MG/DL (ref 70–99)
HCO3 SERPL-SCNC: 25 MMOL/L (ref 22–29)
NT-PROBNP SERPL-MCNC: 2391 PG/ML (ref 0–1800)
POTASSIUM SERPL-SCNC: 5 MMOL/L (ref 3.4–5.3)
SODIUM SERPL-SCNC: 142 MMOL/L (ref 135–145)
TROPONIN T SERPL HS-MCNC: 22 NG/L

## 2024-07-18 PROCEDURE — 83880 ASSAY OF NATRIURETIC PEPTIDE: CPT

## 2024-07-18 PROCEDURE — 80048 BASIC METABOLIC PNL TOTAL CA: CPT

## 2024-07-18 PROCEDURE — 84484 ASSAY OF TROPONIN QUANT: CPT

## 2024-07-18 PROCEDURE — 99214 OFFICE O/P EST MOD 30 MIN: CPT | Performed by: INTERNAL MEDICINE

## 2024-07-18 PROCEDURE — 36415 COLL VENOUS BLD VENIPUNCTURE: CPT

## 2024-07-18 ASSESSMENT — PAIN SCALES - GENERAL: PAINLEVEL: NO PAIN (0)

## 2024-07-18 NOTE — PROGRESS NOTES
Cardiology Clinic Note    HPI    Dear colleagues,     I had the pleasure of seeing Ms. Charleen Childress in the Cardiology clinic.  As you know, Ms. Charleen Childress is a pleasant 78 year old female with a past medical history of wild type ATTR cardiac amyloidosis.  I first met the patient's October 5, 2023.  She has previously seen my colleague Dr. Michelle Rivero.    Patient initially referred to [facility] cardiology for evaluation of atrial fibrillation.  She had a preoperative evaluation for carpal tunnel release which EKG showed atrial fibrillation, low voltage, incomplete right bundle branch block.  She was started on apixaban at this time and referred to cardiology.  She was initially diagnosed with TTR amyloid in 2021 when she underwent a right carpal tunnel release and pathology at that time was positive for TTR amyloid.  She had a nuclear medicine PYP scan afterwards that was essentially equivocal  This was repeat a few times which then showed positivity in August 2023.    She presents alone.  She has no cardiopulmonary symptoms.  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  She stays active with her family who live nearby and work the farm.    PAST MEDICAL HISTORY:  Patient Active Problem List   Diagnosis    Viral warts    CARDIOVASCULAR SCREENING; LDL GOAL LESS THAN 160    Carpal tunnel syndrome of right wrist    Rheumatoid arthritis (H)    Chronic atrial fibrillation (H)    Carpal tunnel syndrome of left wrist    Wild-type transthyretin-related (ATTR) amyloidosis (H)        FAMILY HISTORY:  Family History   Problem Relation Age of Onset    Cerebrovascular Disease Mother     Diabetes Mother     Cancer Father        SOCIAL HISTORY:  Social History     Tobacco Use    Smoking status: Never    Smokeless tobacco: Never   Vaping Use    Vaping status: Never Used   Substance Use Topics    Alcohol use: Yes     Comment: seldom    Drug use: No        CURRENT MEDICATIONS:  Current Outpatient  Medications   Medication Sig Dispense Refill    apixaban ANTICOAGULANT (ELIQUIS ANTICOAGULANT) 5 MG tablet Take 1 tablet (5 mg) by mouth 2 times daily for 360 days 180 tablet 3    tafamidis (VYNDAMAX) 61 MG CAPS capsule Take 1 capsule by mouth every morning      acetaminophen (TYLENOL) 500 MG tablet Take 500-1,000 mg by mouth daily (Patient not taking: Reported on 11/21/2023)         EXAM:  BP (!) 143/68 (BP Location: Right arm, Patient Position: Sitting, Cuff Size: Adult Regular)   Pulse (!) 46   Wt 62.8 kg (138 lb 8 oz)   SpO2 100%   BMI 23.77 kg/m      General: appears comfortable, alert and articulate  Heart: irregularly irregular, no murmur, estimated JVP 8  Lungs: clear, no rales or wheezing  Extremities: no edema  Neurological: normal speech and affect, no gross motor deficits    Weight  Wt Readings from Last 10 Encounters:   07/18/24 62.8 kg (138 lb 8 oz)   11/21/23 63.5 kg (140 lb)   10/05/23 63.5 kg (140 lb)   06/20/23 64.6 kg (142 lb 8 oz)   05/24/23 66.5 kg (146 lb 8 oz)   05/22/23 66.7 kg (147 lb)   12/01/21 62.1 kg (137 lb)   11/09/21 61.9 kg (136 lb 6.4 oz)   07/16/21 59.8 kg (131 lb 12.8 oz)   03/26/21 68 kg (149 lb 14.4 oz)       Testing/Procedures:  I personally visualized and interpreted:  EKG 8/23: Low voltage, atrial fibrillation, incomplete right bundle branch block    Genetic testing 12/2/23: negative for TTR mutation    Outside results of note:  Outside records from [facility] via [facility] Everywhere were obtained, and relevant results/notes have been incorporated into HPI.    Nuclear medicine pyrophosphate PYP scan 8/17/2023:  Summary    1. Strongly suggestive of TTR amyloidosis. A semi-quantitative visual score of 2 or 3 or H/CL ratio >1.5.    2. Heart/Contralateral ratio (H/CL) is 1.58.    3. The Visual score is 3 = High cardiac uptake, heart EUGENIE greater than contralateral bone EUGENIE.     Echocardiogram 8/15/2022:  Summary:    * The left ventricle is normal in size. There is mild  concentric LVH with normal systolic function and EF of 60 to 65% with no regional wall motion abnormalities..    * The left ventricular diastolic function is indeterminate.     * The left atrium is mildly enlarged.     * The right atrium is mildly enlarged.     * The aortic valve is trileaflet with no stenosis and mild aortic regurgitation..    * The mitral valve leaflets are structurally normal.     * There is mild mitral regurgitation.     * There is moderate tricuspid regurgitation.     * No pulmonary hypertension, estimated pulmonary arterial systolic pressure is 34 mmHg.     * The IVC is normal in size (< 2.1 cm), > 50% respiratory variance, RA pressure normal at 3 mmHg.     * There is no pericardial effusion visualized.     * No prior study available for comparison.       Assessment and Plan:     In summary this is a very pleasant 79 yo Female who presents for wild type ATTR cardiac amyloidosis.  Other pertinent history of atrial fibrillation    In support of this diagnosis:  Positive technetium pyrophosphate scan  Echocardiogram showing LVH  Orthopedic conditions, bilateral carpal tunnel, biopsy showing TTR amyloid  Elevated cardiac biomarkers such as troponin and NT proBNP    Patient initially presented in stage 1, given high sensitivity troponin 18 (cut off 50) and NT proBNP 2432 (cut off 3000).    She is currently on tafamidis.  No other HFpEF medications at this moment.  Continue apixaban for thromboembolic prophylaxis of atrial fibrillation, though this should be increased to 5mg BID.    Follow-up in 12 months    The patient states understanding and is agreeable with plan.   Feel free to contact myself regarding questions or concerns.  It was a pleasure to see this patient today.    Vineet Tamayo MD   of Medicine  Advanced Heart Failure and Transplant Cardiology    Today's clinic visit entailed:  30 minutes spent on the date of the encounter doing chart review, history and exam,  documentation and further activities per the note    The level of medical decision making during this visit was of moderate complexity.

## 2024-07-18 NOTE — PATIENT INSTRUCTIONS
Cardiology Providers you saw during your visit:  Dr. Vineet Tamayo     Medication changes:  1- No changes         Follow up:  1- Follow-up with Dr. Tamayo in 1 year with labs prior. We will call you to schedule this appt.        Please call if you have:  1. Weight gain of more than 2 pounds in a day or 5 pounds in a week  2. Increased shortness of breath, swelling or bloating  3. Dizziness, lightheadedness   4. Any questions or concerns.      Heart Failure Support Group  Virtual meetings will continue in 2024 Please reach out if you would like to attend and we can get you the information you need to log in.     2024 dates:    Monday, May 6th, 1-2pm     Monday, June 3rd, 1-2pm     Monday, July 1st, 1-2pm     Monday, August 5th, 1-2pm     Monday, September 9th, 1-2pm     Monday, October 7th, 1-2pm     Monday, November 4th, 1-2pm     Monday, December 2nd, 1-2pm     Follow the American Heart Association Diet and Lifestyle recommendations:  Limit saturated fat, trans fat, sodium, red meat, sweets and sugar-sweetened beverages. If you choose to eat red meat, compare labels and select the leanest cuts available.  Aim for at least 150 minutes of moderate physical activity or 75 minutes of vigorous physical activity - or an equal combination of both - each week.     During business hours: [phone], press option # 1 to schedule an appointment or send a message to your care team     After hours, weekends or holidays: On Call Cardiologist- [phone]   option #4 and ask to speak to the on-call Cardiologist. Inform them you are a CORE/heart failure patient at the [place].     JENNIFER Mora, RN  Hui, JENNIFER

## 2024-07-18 NOTE — NURSING NOTE
"Chief Complaint   Patient presents with    Follow Up     Return Heart Failure for ATTR       Initial BP (!) 143/68 (BP Location: Right arm, Patient Position: Sitting, Cuff Size: Adult Regular)   Pulse (!) 46   Wt 62.8 kg (138 lb 8 oz)   SpO2 100%   BMI 23.77 kg/m   Estimated body mass index is 23.77 kg/m  as calculated from the following:    Height as of 11/21/23: 1.626 m (5' 4\").    Weight as of this encounter: 62.8 kg (138 lb 8 oz).  BP completed using cuff size: regular    Valentina Butt, EMT  "

## 2024-07-31 DIAGNOSIS — I48.91 ATRIAL FIBRILLATION, UNSPECIFIED TYPE (H): ICD-10-CM

## 2024-08-07 RX ORDER — APIXABAN 5 MG/1
5 TABLET, FILM COATED ORAL 2 TIMES DAILY
Qty: 180 TABLET | Refills: 3 | Status: SHIPPED | OUTPATIENT
Start: 2024-08-07

## 2025-03-31 ENCOUNTER — TELEPHONE (OUTPATIENT)
Dept: CARDIOLOGY | Facility: CLINIC | Age: 80
End: 2025-03-31
Payer: COMMERCIAL

## 2025-03-31 NOTE — TELEPHONE ENCOUNTER
Left Voicemail (1st Attempt) for the patient to call back and schedule the following:    Appointment type: Return HF  Provider: Dr. Tamayo  Return date: July/Aug 2025  Specialty phone number: [phone]  Additional appointment(s) needed: yes  Additonal Notes: labs    Attempted to schedule a return HF appointment with Dr. Tamayo, with labs prior.    Patient does not have MyChart or an email.    Sondra HERRERA/Complex Procedure    [facility]   Neurology, NeuroSurgery, NeuroPsychology, Pain Management and Cardiology Specialties  Medical/Surgical Adult Specialties

## 2025-08-05 ENCOUNTER — PRE VISIT (OUTPATIENT)
Dept: CARDIOLOGY | Facility: CLINIC | Age: 80
End: 2025-08-05
Payer: COMMERCIAL

## 2025-08-05 DIAGNOSIS — E85.82 WILD-TYPE TRANSTHYRETIN-RELATED (ATTR) AMYLOIDOSIS (H): Primary | ICD-10-CM

## 2025-08-07 ENCOUNTER — LAB (OUTPATIENT)
Dept: LAB | Facility: CLINIC | Age: 80
End: 2025-08-07
Payer: COMMERCIAL

## 2025-08-07 ENCOUNTER — OFFICE VISIT (OUTPATIENT)
Dept: CARDIOLOGY | Facility: CLINIC | Age: 80
End: 2025-08-07
Payer: COMMERCIAL

## 2025-08-07 VITALS
HEART RATE: 40 BPM | OXYGEN SATURATION: 100 % | BODY MASS INDEX: 24.22 KG/M2 | HEIGHT: 64 IN | SYSTOLIC BLOOD PRESSURE: 153 MMHG | WEIGHT: 141.9 LBS | DIASTOLIC BLOOD PRESSURE: 72 MMHG

## 2025-08-07 DIAGNOSIS — E85.82 WILD-TYPE TRANSTHYRETIN-RELATED (ATTR) AMYLOIDOSIS (H): Primary | ICD-10-CM

## 2025-08-07 DIAGNOSIS — E85.82 WILD-TYPE TRANSTHYRETIN-RELATED (ATTR) AMYLOIDOSIS (H): ICD-10-CM

## 2025-08-07 LAB
ANION GAP SERPL CALCULATED.3IONS-SCNC: 13 MMOL/L (ref 7–15)
BUN SERPL-MCNC: 19.9 MG/DL (ref 8–23)
CALCIUM SERPL-MCNC: 9.7 MG/DL (ref 8.8–10.4)
CHLORIDE SERPL-SCNC: 108 MMOL/L (ref 98–107)
CREAT SERPL-MCNC: 1.08 MG/DL (ref 0.51–0.95)
EGFRCR SERPLBLD CKD-EPI 2021: 52 ML/MIN/1.73M2
GLUCOSE SERPL-MCNC: 130 MG/DL (ref 70–99)
HCO3 SERPL-SCNC: 22 MMOL/L (ref 22–29)
NT-PROBNP SERPL-MCNC: 2099 PG/ML (ref 0–624)
POTASSIUM SERPL-SCNC: 4.3 MMOL/L (ref 3.4–5.3)
SODIUM SERPL-SCNC: 143 MMOL/L (ref 135–145)
TROPONIN T SERPL HS-MCNC: 25 NG/L

## 2025-08-07 ASSESSMENT — PAIN SCALES - GENERAL: PAINLEVEL_OUTOF10: NO PAIN (0)

## 2025-08-18 ENCOUNTER — TELEPHONE (OUTPATIENT)
Dept: CARDIOLOGY | Facility: CLINIC | Age: 80
End: 2025-08-18
Payer: COMMERCIAL

## 2025-08-18 DIAGNOSIS — I48.91 ATRIAL FIBRILLATION, UNSPECIFIED TYPE (H): ICD-10-CM

## (undated) DEVICE — GLOVE BIOGEL PI ULTRATOUCH G SZ 7.5 42175

## (undated) DEVICE — SOL WATER IRRIG 1000ML BOTTLE 07139-09

## (undated) DEVICE — LUBRICATING JELLY 4.25OZ

## (undated) DEVICE — TUBING SUCTION 12"X1/4" N612

## (undated) DEVICE — KIT ENDO TURNOVER/PROCEDURE CARRY-ON 101822

## (undated) DEVICE — GLOVE BIOGEL PI MICRO INDICATOR UNDERGLOVE SZ 7.5 48975

## (undated) DEVICE — PREP CHLORAPREP 26ML TINTED ORANGE  260815

## (undated) RX ORDER — FENTANYL CITRATE 50 UG/ML
INJECTION, SOLUTION INTRAMUSCULAR; INTRAVENOUS
Status: DISPENSED
Start: 2023-06-08

## (undated) RX ORDER — PROPOFOL 10 MG/ML
INJECTION, EMULSION INTRAVENOUS
Status: DISPENSED
Start: 2023-06-08

## (undated) RX ORDER — ONDANSETRON 2 MG/ML
INJECTION INTRAMUSCULAR; INTRAVENOUS
Status: DISPENSED
Start: 2023-06-08

## (undated) RX ORDER — LIDOCAINE HYDROCHLORIDE 10 MG/ML
INJECTION, SOLUTION EPIDURAL; INFILTRATION; INTRACAUDAL; PERINEURAL
Status: DISPENSED
Start: 2023-06-08

## (undated) RX ORDER — FENTANYL CITRATE 50 UG/ML
INJECTION, SOLUTION INTRAMUSCULAR; INTRAVENOUS
Status: DISPENSED
Start: 2023-08-11